# Patient Record
Sex: MALE | Race: WHITE | NOT HISPANIC OR LATINO | URBAN - METROPOLITAN AREA
[De-identification: names, ages, dates, MRNs, and addresses within clinical notes are randomized per-mention and may not be internally consistent; named-entity substitution may affect disease eponyms.]

---

## 2018-11-26 ENCOUNTER — EMERGENCY (EMERGENCY)
Facility: HOSPITAL | Age: 63
LOS: 1 days | Discharge: HOME | End: 2018-11-26
Attending: EMERGENCY MEDICINE
Payer: MEDICAID

## 2018-11-26 ENCOUNTER — TRANSCRIPTION ENCOUNTER (OUTPATIENT)
Age: 63
End: 2018-11-26

## 2018-11-26 VITALS
DIASTOLIC BLOOD PRESSURE: 93 MMHG | SYSTOLIC BLOOD PRESSURE: 171 MMHG | TEMPERATURE: 98 F | OXYGEN SATURATION: 100 % | WEIGHT: 160.06 LBS | HEIGHT: 66 IN | HEART RATE: 98 BPM | RESPIRATION RATE: 14 BRPM

## 2018-11-26 VITALS
DIASTOLIC BLOOD PRESSURE: 86 MMHG | OXYGEN SATURATION: 99 % | TEMPERATURE: 98 F | SYSTOLIC BLOOD PRESSURE: 168 MMHG | HEART RATE: 95 BPM | RESPIRATION RATE: 16 BRPM

## 2018-11-26 DIAGNOSIS — Z98.890 OTHER SPECIFIED POSTPROCEDURAL STATES: Chronic | ICD-10-CM

## 2018-11-26 DIAGNOSIS — Z90.49 ACQUIRED ABSENCE OF OTHER SPECIFIED PARTS OF DIGESTIVE TRACT: Chronic | ICD-10-CM

## 2018-11-26 LAB
ANION GAP SERPL CALC-SCNC: 6 MMOL/L — SIGNIFICANT CHANGE UP (ref 5–17)
BASOPHILS # BLD AUTO: 0.01 K/UL — SIGNIFICANT CHANGE UP (ref 0–0.2)
BASOPHILS NFR BLD AUTO: 0.3 % — SIGNIFICANT CHANGE UP (ref 0–2)
BUN SERPL-MCNC: 20 MG/DL — SIGNIFICANT CHANGE UP (ref 7–23)
CALCIUM SERPL-MCNC: 8.3 MG/DL — LOW (ref 8.5–10.1)
CHLORIDE SERPL-SCNC: 102 MMOL/L — SIGNIFICANT CHANGE UP (ref 96–108)
CO2 SERPL-SCNC: 28 MMOL/L — SIGNIFICANT CHANGE UP (ref 22–31)
CREAT SERPL-MCNC: 1.1 MG/DL — SIGNIFICANT CHANGE UP (ref 0.5–1.3)
EOSINOPHIL # BLD AUTO: 0.11 K/UL — SIGNIFICANT CHANGE UP (ref 0–0.5)
EOSINOPHIL NFR BLD AUTO: 2.8 % — SIGNIFICANT CHANGE UP (ref 0–6)
GLUCOSE SERPL-MCNC: 312 MG/DL — HIGH (ref 70–99)
HCT VFR BLD CALC: 35.3 % — LOW (ref 39–50)
HGB BLD-MCNC: 12.7 G/DL — LOW (ref 13–17)
IMM GRANULOCYTES NFR BLD AUTO: 0.3 % — SIGNIFICANT CHANGE UP (ref 0–1.5)
LYMPHOCYTES # BLD AUTO: 1.59 K/UL — SIGNIFICANT CHANGE UP (ref 1–3.3)
LYMPHOCYTES # BLD AUTO: 40.5 % — SIGNIFICANT CHANGE UP (ref 13–44)
MCHC RBC-ENTMCNC: 27.7 PG — SIGNIFICANT CHANGE UP (ref 27–34)
MCHC RBC-ENTMCNC: 36 GM/DL — SIGNIFICANT CHANGE UP (ref 32–36)
MCV RBC AUTO: 77.1 FL — LOW (ref 80–100)
MONOCYTES # BLD AUTO: 0.42 K/UL — SIGNIFICANT CHANGE UP (ref 0–0.9)
MONOCYTES NFR BLD AUTO: 10.7 % — SIGNIFICANT CHANGE UP (ref 2–14)
NEUTROPHILS # BLD AUTO: 1.79 K/UL — LOW (ref 1.8–7.4)
NEUTROPHILS NFR BLD AUTO: 45.4 % — SIGNIFICANT CHANGE UP (ref 43–77)
NRBC # BLD: 0 /100 WBCS — SIGNIFICANT CHANGE UP (ref 0–0)
PLATELET # BLD AUTO: 229 K/UL — SIGNIFICANT CHANGE UP (ref 150–400)
POTASSIUM SERPL-MCNC: 3.8 MMOL/L — SIGNIFICANT CHANGE UP (ref 3.5–5.3)
POTASSIUM SERPL-SCNC: 3.8 MMOL/L — SIGNIFICANT CHANGE UP (ref 3.5–5.3)
RBC # BLD: 4.58 M/UL — SIGNIFICANT CHANGE UP (ref 4.2–5.8)
RBC # FLD: 12.9 % — SIGNIFICANT CHANGE UP (ref 10.3–14.5)
SODIUM SERPL-SCNC: 136 MMOL/L — SIGNIFICANT CHANGE UP (ref 135–145)
WBC # BLD: 3.93 K/UL — SIGNIFICANT CHANGE UP (ref 3.8–10.5)
WBC # FLD AUTO: 3.93 K/UL — SIGNIFICANT CHANGE UP (ref 3.8–10.5)

## 2018-11-26 PROCEDURE — 93970 EXTREMITY STUDY: CPT | Mod: 26

## 2018-11-26 PROCEDURE — 93970 EXTREMITY STUDY: CPT

## 2018-11-26 PROCEDURE — 85027 COMPLETE CBC AUTOMATED: CPT

## 2018-11-26 PROCEDURE — 80048 BASIC METABOLIC PNL TOTAL CA: CPT

## 2018-11-26 PROCEDURE — 99284 EMERGENCY DEPT VISIT MOD MDM: CPT | Mod: 25

## 2018-11-26 PROCEDURE — 99283 EMERGENCY DEPT VISIT LOW MDM: CPT

## 2018-11-26 PROCEDURE — 82962 GLUCOSE BLOOD TEST: CPT

## 2018-11-26 PROCEDURE — 36415 COLL VENOUS BLD VENIPUNCTURE: CPT

## 2018-11-26 RX ORDER — SODIUM CHLORIDE 9 MG/ML
1000 INJECTION INTRAMUSCULAR; INTRAVENOUS; SUBCUTANEOUS ONCE
Qty: 0 | Refills: 0 | Status: COMPLETED | OUTPATIENT
Start: 2018-11-26 | End: 2018-11-26

## 2018-11-26 RX ADMIN — SODIUM CHLORIDE 2000 MILLILITER(S): 9 INJECTION INTRAMUSCULAR; INTRAVENOUS; SUBCUTANEOUS at 16:27

## 2018-11-26 NOTE — ED PROVIDER NOTE - MEDICAL DECISION MAKING DETAILS
leg pain sp long drive 16 hours , hx dm,  good pulses, no swelling, ro dvt, vs  sciatica, neuro intact, check us, labs for dm,  d/c if neg leg pain sp long drive 16 hours , hx dm,  good pulses, no swelling, ro dvt, vs  sciatica, neuro intact, check us, labs for dm,  d/c if neg--fu endo

## 2018-11-26 NOTE — ED ADULT NURSE NOTE - OBJECTIVE STATEMENT
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

## 2018-11-26 NOTE — ED ADULT TRIAGE NOTE - CHIEF COMPLAINT QUOTE
"He has pain in his right leg."  pt c/o several days of pain down right leg; recently had long car ride

## 2018-11-26 NOTE — ED ADULT NURSE NOTE - NSIMPLEMENTINTERV_GEN_ALL_ED
Implemented All Universal Safety Interventions:  [place] to call system. Call bell, personal items and telephone within reach. Instruct patient to call for assistance. Room bathroom lighting operational. Non-slip footwear when patient is off stretcher. Physically safe environment: no spills, clutter or unnecessary equipment. Stretcher in lowest position, wheels locked, appropriate side rails in place.

## 2018-11-26 NOTE — ED PROVIDER NOTE - NEUROLOGICAL, MLM
Alert and oriented, no focal deficits, no motor or sensory deficits.  motor 5/5, sensory intact, no saddle anesthesia

## 2018-11-26 NOTE — ED PROVIDER NOTE - CARE PLAN
Principal Discharge DX:	Leg pain Principal Discharge DX:	Leg pain  Secondary Diagnosis:	Diabetes mellitus

## 2019-08-08 ENCOUNTER — INPATIENT (INPATIENT)
Facility: HOSPITAL | Age: 64
LOS: 1 days | Discharge: HOME | DRG: 247 | End: 2019-08-10
Attending: INTERNAL MEDICINE | Admitting: INTERNAL MEDICINE
Payer: MEDICARE

## 2019-08-08 ENCOUNTER — EMERGENCY (EMERGENCY)
Facility: HOSPITAL | Age: 64
LOS: 1 days | Discharge: ANOTHER HOSPITAL | End: 2019-08-08
Attending: EMERGENCY MEDICINE | Admitting: EMERGENCY MEDICINE
Payer: MEDICARE

## 2019-08-08 VITALS
HEIGHT: 66 IN | OXYGEN SATURATION: 97 % | SYSTOLIC BLOOD PRESSURE: 166 MMHG | HEART RATE: 80 BPM | DIASTOLIC BLOOD PRESSURE: 68 MMHG | WEIGHT: 160.06 LBS | TEMPERATURE: 98 F | RESPIRATION RATE: 18 BRPM

## 2019-08-08 VITALS
RESPIRATION RATE: 18 BRPM | TEMPERATURE: 99 F | HEART RATE: 91 BPM | SYSTOLIC BLOOD PRESSURE: 146 MMHG | DIASTOLIC BLOOD PRESSURE: 82 MMHG | WEIGHT: 160.06 LBS | OXYGEN SATURATION: 96 %

## 2019-08-08 DIAGNOSIS — Z98.890 OTHER SPECIFIED POSTPROCEDURAL STATES: Chronic | ICD-10-CM

## 2019-08-08 DIAGNOSIS — R07.9 CHEST PAIN, UNSPECIFIED: ICD-10-CM

## 2019-08-08 PROBLEM — E78.00 PURE HYPERCHOLESTEROLEMIA, UNSPECIFIED: Chronic | Status: ACTIVE | Noted: 2018-11-26

## 2019-08-08 PROBLEM — E11.9 TYPE 2 DIABETES MELLITUS WITHOUT COMPLICATIONS: Chronic | Status: ACTIVE | Noted: 2018-11-26

## 2019-08-08 LAB
ALBUMIN SERPL ELPH-MCNC: 3.4 G/DL — SIGNIFICANT CHANGE UP (ref 3.3–5)
ALP SERPL-CCNC: 89 U/L — SIGNIFICANT CHANGE UP (ref 40–120)
ALT FLD-CCNC: 18 U/L — SIGNIFICANT CHANGE UP (ref 12–78)
ANION GAP SERPL CALC-SCNC: 9 MMOL/L — SIGNIFICANT CHANGE UP (ref 5–17)
AST SERPL-CCNC: 15 U/L — SIGNIFICANT CHANGE UP (ref 15–37)
BILIRUB SERPL-MCNC: 0.3 MG/DL — SIGNIFICANT CHANGE UP (ref 0.2–1.2)
BUN SERPL-MCNC: 21 MG/DL — SIGNIFICANT CHANGE UP (ref 7–23)
CALCIUM SERPL-MCNC: 8.3 MG/DL — LOW (ref 8.5–10.1)
CHLORIDE SERPL-SCNC: 105 MMOL/L — SIGNIFICANT CHANGE UP (ref 96–108)
CO2 SERPL-SCNC: 26 MMOL/L — SIGNIFICANT CHANGE UP (ref 22–31)
CREAT SERPL-MCNC: 1.3 MG/DL — SIGNIFICANT CHANGE UP (ref 0.5–1.3)
D DIMER BLD IA.RAPID-MCNC: <150 NG/ML DDU — SIGNIFICANT CHANGE UP
GLUCOSE BLDC GLUCOMTR-MCNC: 100 MG/DL — HIGH (ref 70–99)
GLUCOSE BLDC GLUCOMTR-MCNC: 116 MG/DL — HIGH (ref 70–99)
GLUCOSE SERPL-MCNC: 142 MG/DL — HIGH (ref 70–99)
HCT VFR BLD CALC: 33.6 % — LOW (ref 39–50)
HGB BLD-MCNC: 11.6 G/DL — LOW (ref 13–17)
MCHC RBC-ENTMCNC: 27.9 PG — SIGNIFICANT CHANGE UP (ref 27–34)
MCHC RBC-ENTMCNC: 34.5 GM/DL — SIGNIFICANT CHANGE UP (ref 32–36)
MCV RBC AUTO: 80.8 FL — SIGNIFICANT CHANGE UP (ref 80–100)
NRBC # BLD: 0 /100 WBCS — SIGNIFICANT CHANGE UP (ref 0–0)
NT-PROBNP SERPL-SCNC: 543 PG/ML — HIGH (ref 0–125)
PLATELET # BLD AUTO: 220 K/UL — SIGNIFICANT CHANGE UP (ref 150–400)
POTASSIUM SERPL-MCNC: 4.2 MMOL/L — SIGNIFICANT CHANGE UP (ref 3.5–5.3)
POTASSIUM SERPL-SCNC: 4.2 MMOL/L — SIGNIFICANT CHANGE UP (ref 3.5–5.3)
PROT SERPL-MCNC: 7.6 G/DL — SIGNIFICANT CHANGE UP (ref 6–8.3)
RBC # BLD: 4.16 M/UL — LOW (ref 4.2–5.8)
RBC # FLD: 13 % — SIGNIFICANT CHANGE UP (ref 10.3–14.5)
SODIUM SERPL-SCNC: 140 MMOL/L — SIGNIFICANT CHANGE UP (ref 135–145)
TROPONIN I SERPL-MCNC: <.015 NG/ML — SIGNIFICANT CHANGE UP (ref 0.01–0.04)
WBC # BLD: 3.54 K/UL — LOW (ref 3.8–10.5)
WBC # FLD AUTO: 3.54 K/UL — LOW (ref 3.8–10.5)

## 2019-08-08 PROCEDURE — 93010 ELECTROCARDIOGRAM REPORT: CPT

## 2019-08-08 PROCEDURE — 84484 ASSAY OF TROPONIN QUANT: CPT

## 2019-08-08 PROCEDURE — 36415 COLL VENOUS BLD VENIPUNCTURE: CPT

## 2019-08-08 PROCEDURE — 71046 X-RAY EXAM CHEST 2 VIEWS: CPT

## 2019-08-08 PROCEDURE — 85379 FIBRIN DEGRADATION QUANT: CPT

## 2019-08-08 PROCEDURE — 99285 EMERGENCY DEPT VISIT HI MDM: CPT

## 2019-08-08 PROCEDURE — 99291 CRITICAL CARE FIRST HOUR: CPT | Mod: 25

## 2019-08-08 PROCEDURE — 80053 COMPREHEN METABOLIC PANEL: CPT

## 2019-08-08 PROCEDURE — 93005 ELECTROCARDIOGRAM TRACING: CPT

## 2019-08-08 PROCEDURE — 71046 X-RAY EXAM CHEST 2 VIEWS: CPT | Mod: 26

## 2019-08-08 PROCEDURE — 85027 COMPLETE CBC AUTOMATED: CPT

## 2019-08-08 PROCEDURE — 83880 ASSAY OF NATRIURETIC PEPTIDE: CPT

## 2019-08-08 PROCEDURE — 99291 CRITICAL CARE FIRST HOUR: CPT

## 2019-08-08 RX ORDER — INSULIN LISPRO 100/ML
VIAL (ML) SUBCUTANEOUS AT BEDTIME
Refills: 0 | Status: DISCONTINUED | OUTPATIENT
Start: 2019-08-08 | End: 2019-08-10

## 2019-08-08 RX ORDER — METFORMIN HYDROCHLORIDE 850 MG/1
0 TABLET ORAL
Qty: 0 | Refills: 0 | DISCHARGE

## 2019-08-08 RX ORDER — INSULIN LISPRO 100/ML
VIAL (ML) SUBCUTANEOUS
Refills: 0 | Status: DISCONTINUED | OUTPATIENT
Start: 2019-08-08 | End: 2019-08-10

## 2019-08-08 RX ORDER — DEXTROSE 50 % IN WATER 50 %
25 SYRINGE (ML) INTRAVENOUS ONCE
Refills: 0 | Status: DISCONTINUED | OUTPATIENT
Start: 2019-08-08 | End: 2019-08-10

## 2019-08-08 RX ORDER — GABAPENTIN 400 MG/1
0 CAPSULE ORAL
Qty: 0 | Refills: 0 | DISCHARGE

## 2019-08-08 RX ORDER — TICAGRELOR 90 MG/1
180 TABLET ORAL ONCE
Refills: 0 | Status: COMPLETED | OUTPATIENT
Start: 2019-08-08 | End: 2019-08-08

## 2019-08-08 RX ORDER — DEXTROSE 50 % IN WATER 50 %
12.5 SYRINGE (ML) INTRAVENOUS ONCE
Refills: 0 | Status: DISCONTINUED | OUTPATIENT
Start: 2019-08-08 | End: 2019-08-10

## 2019-08-08 RX ORDER — GLUCAGON INJECTION, SOLUTION 0.5 MG/.1ML
1 INJECTION, SOLUTION SUBCUTANEOUS ONCE
Refills: 0 | Status: DISCONTINUED | OUTPATIENT
Start: 2019-08-08 | End: 2019-08-10

## 2019-08-08 RX ORDER — DEXTROSE 50 % IN WATER 50 %
15 SYRINGE (ML) INTRAVENOUS ONCE
Refills: 0 | Status: DISCONTINUED | OUTPATIENT
Start: 2019-08-08 | End: 2019-08-10

## 2019-08-08 RX ORDER — SODIUM CHLORIDE 9 MG/ML
1000 INJECTION, SOLUTION INTRAVENOUS
Refills: 0 | Status: DISCONTINUED | OUTPATIENT
Start: 2019-08-08 | End: 2019-08-10

## 2019-08-08 RX ORDER — ASPIRIN/CALCIUM CARB/MAGNESIUM 324 MG
325 TABLET ORAL ONCE
Refills: 0 | Status: COMPLETED | OUTPATIENT
Start: 2019-08-08 | End: 2019-08-08

## 2019-08-08 RX ADMIN — TICAGRELOR 180 MILLIGRAM(S): 90 TABLET ORAL at 17:44

## 2019-08-08 RX ADMIN — Medication 325 MILLIGRAM(S): at 17:44

## 2019-08-08 NOTE — CONSULT NOTE ADULT - ATTENDING COMMENTS
I saw and examined the patient personally. Spoke with above provider regarding this case. I reviewed the above findings completely.  I agree with the above history, physical, and plan which I have edited where appropriate.   He is having unstable angina.   At this point need coronary angiography. r/b/a explained and he agrees. Transfer arranged with Dr. Pisano to [facility] as above

## 2019-08-08 NOTE — CONSULT NOTE ADULT - SUBJECTIVE AND OBJECTIVE BOX
CHIEF COMPLAINT: Patient is a 64y old  Male who presents with a chief complaint of CP    HPI: THis is a 65 y/o male w/ PMH hld, HTN, DM Who presented today to the ED c/o CP x 1 month with SOB over the last week.  The chest pressure began as an exertional pressure which went away with rest but has now progressed to pain at rest including waking him from a sleep last night.  He has had a decrease in exercise tolerance where he is unable to walk up one flight of stairs or to the corner of his block.  No complaints of  palpitations  N/V, HA reported. No signs of orthopnea or PND.       PAST MEDICAL & SURGICAL HISTORY:  HTN (hypertension)  High cholesterol  Diabetes mellitus  H/O hernia repair      SOCIAL HISTORY:  No tobacco, ethanol, or drug abuse.    FAMILY HISTORY:  No pertinent family history in first degree relatives    No family history of acute MI or sudden cardiac death.    MEDICATIONS  (STANDING):    MEDICATIONS  (PRN):      Allergies    No Known Allergies    Intolerances        REVIEW OF SYSTEMS:    CONSTITUTIONAL: No weakness, fevers or chills  EYES/ENT: No visual changes;  No vertigo or throat pain   NECK: No pain or stiffness  RESPIRATORY: No cough, wheezing, hemoptysis; No shortness of breath  CARDIOVASCULAR: No chest pain or palpitations  GASTROINTESTINAL: No abdominal pain. No nausea, vomiting, or hematemesis; No diarrhea or constipation. No melena or hematochezia.  GENITOURINARY: No dysuria, frequency or hematuria  NEUROLOGICAL: No numbness or weakness  SKIN: No itching or rash  All other review of systems is negative unless indicated above    VITAL SIGNS:   Vital Signs Last 24 Hrs  T(C): 37.2 (08 Aug 2019 15:07), Max: 37.2 (08 Aug 2019 15:07)  T(F): 98.9 (08 Aug 2019 15:07), Max: 98.9 (08 Aug 2019 15:07)  HR: 91 (08 Aug 2019 15:07) (91 - 91)  BP: 146/82 (08 Aug 2019 15:07) (146/82 - 146/82)  BP(mean): --  RR: 18 (08 Aug 2019 15:07) (18 - 18)  SpO2: 96% (08 Aug 2019 15:07) (96% - 96%)    I&O's Summary      On Exam:    Constitutional: NAD, alert and oriented x 3  Lungs:  Non-labored, breath sounds are clear bilaterally, No wheezing, rales or rhonchi  Cardiovascular: RRR.  S1 and S2 positive.  No murmurs, rubs, gallops or clicks  Gastrointestinal: Bowel Sounds present, soft, nontender.   Lymph: No peripheral edema. No cervical lymphadenopathy.  Neurological: Alert, no focal deficits  Skin: No rashes or ulcers   Psych:  Mood & affect appropriate.    LABS: All Labs Reviewed:                        11.6   3.54  )-----------( 220      ( 08 Aug 2019 15:57 )             33.6     08 Aug 2019 15:57    140    |  105    |  21     ----------------------------<  142    4.2     |  26     |  1.30     Ca    8.3        08 Aug 2019 15:57    TPro  7.6    /  Alb  3.4    /  TBili  0.3    /  DBili  x      /  AST  15     /  ALT  18     /  AlkPhos  89     08 Aug 2019 15:57      CARDIAC MARKERS ( 08 Aug 2019 15:57 )  <.015 ng/mL / x     / x     / x     / x          Blood Culture:   08-08 @ 15:57  Pro Bnp 543        RADIOLOGY:  < from: Xray Chest 2 Views PA/Lat (08.08.19 @ 16:01) >  EXAM:  XR CHEST PA LAT 2V                            PROCEDURE DATE:  08/08/2019          INTERPRETATION:  Chest pain.    PA lateral.    Heart and mediastinum normal.  Lungs clear.  Costophrenic angles sharp   bilaterally. Degenerative spondylosis dorsal spine    IMPRESSION: No active disease                BENJAMÍN FARIAS M.D., ATTENDING RADIOLOGIST  This document has been electronically signed. Aug  8 2019  4:05PM                < end of copied text >    EKG:  NSR @ 90  Assessment/Plan:  64y Male CHIEF COMPLAINT: Patient is a 64y old  Male who presents with a chief complaint of CP    HPI: THis is a 63 y/o male w/ PMH hld, HTN, DM Who presented today to the ED c/o CP x 1 month with SOB over the last week.  The chest pressure began as an exertional pressure which went away with rest but has now progressed to pain at rest including waking him from a sleep last night.  He has had a decrease in exercise tolerance where he is unable to walk up one flight of stairs or to the corner of his block.  No complaints of  palpitations  N/V, HA reported. No signs of orthopnea or PND.       PAST MEDICAL & SURGICAL HISTORY:  HTN (hypertension)  High cholesterol  Diabetes mellitus  H/O hernia repair      SOCIAL HISTORY:  No tobacco, ethanol, or drug abuse.    FAMILY HISTORY:  Brother with early CAD  No family history  sudden cardiac death.    MEDICATIONS  (STANDING):    MEDICATIONS  (PRN):      Allergies    No Known Allergies    Intolerances        REVIEW OF SYSTEMS:    CONSTITUTIONAL: No weakness, fevers or chills  EYES/ENT: No visual changes;  No vertigo or throat pain   NECK: No pain or stiffness  RESPIRATORY: No cough, wheezing, hemoptysis; No shortness of breath  CARDIOVASCULAR: No chest pain or palpitations  GASTROINTESTINAL: No abdominal pain. No nausea, vomiting, or hematemesis; No diarrhea or constipation. No melena or hematochezia.  GENITOURINARY: No dysuria, frequency or hematuria  NEUROLOGICAL: No numbness or weakness  SKIN: No itching or rash  All other review of systems is negative unless indicated above    VITAL SIGNS:   Vital Signs Last 24 Hrs  T(C): 37.2 (08 Aug 2019 15:07), Max: 37.2 (08 Aug 2019 15:07)  T(F): 98.9 (08 Aug 2019 15:07), Max: 98.9 (08 Aug 2019 15:07)  HR: 91 (08 Aug 2019 15:07) (91 - 91)  BP: 146/82 (08 Aug 2019 15:07) (146/82 - 146/82)  BP(mean): --  RR: 18 (08 Aug 2019 15:07) (18 - 18)  SpO2: 96% (08 Aug 2019 15:07) (96% - 96%)    I&O's Summary      On Exam:    Constitutional: NAD, alert and oriented x 3  HEENT: MMM. anicteric  Lungs:  Non-labored, breath sounds are clear bilaterally, No wheezing, rales or rhonchi  Cardiovascular: RRR.  S1 and S2 positive.  No murmurs, rubs, gallops or clicks  Gastrointestinal: Bowel Sounds present, soft, nontender.   Lymph: No peripheral edema. No cervical lymphadenopathy.  Neurological: Alert, no focal deficits  Skin: No rashes or ulcers   Psych:  Mood & affect appropriate.    LABS: All Labs Reviewed:                        11.6   3.54  )-----------( 220      ( 08 Aug 2019 15:57 )             33.6     08 Aug 2019 15:57    140    |  105    |  21     ----------------------------<  142    4.2     |  26     |  1.30     Ca    8.3        08 Aug 2019 15:57    TPro  7.6    /  Alb  3.4    /  TBili  0.3    /  DBili  x      /  AST  15     /  ALT  18     /  AlkPhos  89     08 Aug 2019 15:57      CARDIAC MARKERS ( 08 Aug 2019 15:57 )  <.015 ng/mL / x     / x     / x     / x          Blood Culture:   08-08 @ 15:57  Pro Bnp 543        RADIOLOGY:  < from: Xray Chest 2 Views PA/Lat (08.08.19 @ 16:01) >  EXAM:  XR CHEST PA LAT 2V                            PROCEDURE DATE:  08/08/2019          INTERPRETATION:  Chest pain.    PA lateral.    Heart and mediastinum normal.  Lungs clear.  Costophrenic angles sharp   bilaterally. Degenerative spondylosis dorsal spine    IMPRESSION: No active disease                BENJAMÍN FARIAS M.D., ATTENDING RADIOLOGIST  This document has been electronically signed. Aug  8 2019  4:05PM                < end of copied text >    EKG:  NSR @ 90  Assessment/Plan:  64y Male

## 2019-08-08 NOTE — CONSULT NOTE ADULT - ASSESSMENT
THis is a 63 y/o male w/ PMH hld, HTN, DM Who presented today to the ED c/o CP x 1 month which has progressed to unstable angina with SOB over the last week.     Pt has been symptomatic   -there is no evidence of acute ischemia.  ECg w/o ischemic changes CE negative x1  The patient's progression of Angina from stable to unstable and the decreased exercise tolerance is concerning   - We will arrange for transport to [facility] for a cornary angiogram  - Give  mg po x1  - Brilinta 180 mg PO x1     -there is no evidence of significant arrhythmia.  ECg NSR    -there is no evidence for meaningful  volume overload.    -Pt is hemodynamically stable  -Monitor and replete lytes, keep K>4 and Mg >2  Thank you for the consult  Abel Lees [facility]  Cardiology   Spectra #[phone]   Will continue to follow  Abel Lees ANP

## 2019-08-08 NOTE — ED ADULT NURSE NOTE - NSIMPLEMENTINTERV_GEN_ALL_ED
Implemented All Universal Safety Interventions:  [place] to call system. Call bell, personal items and telephone within reach. Instruct patient to call for assistance. Room bathroom lighting operational. Non-slip footwear when patient is off stretcher. Physically safe environment: no spills, clutter or unnecessary equipment. Stretcher in lowest position, wheels locked, appropriate side rails in place.

## 2019-08-08 NOTE — ED PROVIDER NOTE - PROGRESS NOTE DETAILS
All results were explained to patient and/or family and a copy of all available results given.  case dw RENETTA Diaz, transfer to cardiac cath

## 2019-08-08 NOTE — ED ADULT NURSE NOTE - TEMPLATE
How Many Skin Cancers Have You Had?: more than one
What Is The Reason For Today's Visit?: Follow Up Non-Melanoma Skin Cancer
When Was Your Last Cancer Diagnosed?: 2017
Cardiac

## 2019-08-08 NOTE — ED PROVIDER NOTE - OBJECTIVE STATEMENT
Left chest pain x 3.5-4 weeks.  exertional sob.  pmd- Declan in [facility] [place].  no prior episodes .  no other complaint.

## 2019-08-08 NOTE — H&P CARDIOLOGY - HISTORY OF PRESENT ILLNESS
65 y/o male w/ PMH hld, HTN, DM Who presented today to [place]  the ED with c/o CP x 1 month with SOB over the last week.  The chest pressure began as an exertional pressure which went away with rest but has now progressed to pain at rest including waking him from a sleep last night.  He has had a decrease in exercise tolerance where he is unable to walk up one flight of stairs or to the corner of his block.  Pt now transferred to [facility] for possible cardiac cath 63 y/o male w/ PMH hld, HTN, DM Who presented today to [facility] ED  with c/o substernal chest pain for approx one month with associated SOB. Pt reports that initially, he had chest pressure on exertion. However now it has progressed to chest pain even at rest.  He also reports chest pain at rest and a  decrease in exercise tolerance. He denies N/V/D/ or palpitations.  Pt now transferred to [facility] for possible cardiac cath

## 2019-08-09 ENCOUNTER — TRANSCRIPTION ENCOUNTER (OUTPATIENT)
Age: 64
End: 2019-08-09

## 2019-08-09 LAB
ANION GAP SERPL CALC-SCNC: 13 MMOL/L — SIGNIFICANT CHANGE UP (ref 5–17)
BUN SERPL-MCNC: 21 MG/DL — SIGNIFICANT CHANGE UP (ref 7–23)
CALCIUM SERPL-MCNC: 8.9 MG/DL — SIGNIFICANT CHANGE UP (ref 8.4–10.5)
CHLORIDE SERPL-SCNC: 102 MMOL/L — SIGNIFICANT CHANGE UP (ref 96–108)
CO2 SERPL-SCNC: 24 MMOL/L — SIGNIFICANT CHANGE UP (ref 22–31)
CREAT SERPL-MCNC: 1.27 MG/DL — SIGNIFICANT CHANGE UP (ref 0.5–1.3)
GLUCOSE BLDC GLUCOMTR-MCNC: 100 MG/DL — HIGH (ref 70–99)
GLUCOSE BLDC GLUCOMTR-MCNC: 109 MG/DL — HIGH (ref 70–99)
GLUCOSE BLDC GLUCOMTR-MCNC: 128 MG/DL — HIGH (ref 70–99)
GLUCOSE BLDC GLUCOMTR-MCNC: 143 MG/DL — HIGH (ref 70–99)
GLUCOSE BLDC GLUCOMTR-MCNC: 156 MG/DL — HIGH (ref 70–99)
GLUCOSE SERPL-MCNC: 115 MG/DL — HIGH (ref 70–99)
HBA1C BLD-MCNC: 5.9 % — HIGH (ref 4–5.6)
HCT VFR BLD CALC: 35.1 % — LOW (ref 39–50)
HGB BLD-MCNC: 11.7 G/DL — LOW (ref 13–17)
MCHC RBC-ENTMCNC: 27.3 PG — SIGNIFICANT CHANGE UP (ref 27–34)
MCHC RBC-ENTMCNC: 33.3 GM/DL — SIGNIFICANT CHANGE UP (ref 32–36)
MCV RBC AUTO: 81.9 FL — SIGNIFICANT CHANGE UP (ref 80–100)
PLATELET # BLD AUTO: 208 K/UL — SIGNIFICANT CHANGE UP (ref 150–400)
POTASSIUM SERPL-MCNC: 3.7 MMOL/L — SIGNIFICANT CHANGE UP (ref 3.5–5.3)
POTASSIUM SERPL-SCNC: 3.7 MMOL/L — SIGNIFICANT CHANGE UP (ref 3.5–5.3)
RBC # BLD: 4.28 M/UL — SIGNIFICANT CHANGE UP (ref 4.2–5.8)
RBC # FLD: 12.5 % — SIGNIFICANT CHANGE UP (ref 10.3–14.5)
SODIUM SERPL-SCNC: 139 MMOL/L — SIGNIFICANT CHANGE UP (ref 135–145)
WBC # BLD: 4 K/UL — SIGNIFICANT CHANGE UP (ref 3.8–10.5)
WBC # FLD AUTO: 4 K/UL — SIGNIFICANT CHANGE UP (ref 3.8–10.5)

## 2019-08-09 PROCEDURE — 92978 ENDOLUMINL IVUS OCT C 1ST: CPT | Mod: 26,LD

## 2019-08-09 PROCEDURE — 93010 ELECTROCARDIOGRAM REPORT: CPT

## 2019-08-09 PROCEDURE — 93458 L HRT ARTERY/VENTRICLE ANGIO: CPT | Mod: 26,59

## 2019-08-09 PROCEDURE — 99222 1ST HOSP IP/OBS MODERATE 55: CPT

## 2019-08-09 PROCEDURE — 99152 MOD SED SAME PHYS/QHP 5/>YRS: CPT

## 2019-08-09 PROCEDURE — 92928 PRQ TCAT PLMT NTRAC ST 1 LES: CPT | Mod: RC

## 2019-08-09 RX ORDER — GABAPENTIN 400 MG/1
100 CAPSULE ORAL
Refills: 0 | Status: DISCONTINUED | OUTPATIENT
Start: 2019-08-09 | End: 2019-08-10

## 2019-08-09 RX ORDER — GEMFIBROZIL 600 MG
600 TABLET ORAL
Refills: 0 | Status: DISCONTINUED | OUTPATIENT
Start: 2019-08-09 | End: 2019-08-10

## 2019-08-09 RX ORDER — ATORVASTATIN CALCIUM 80 MG/1
40 TABLET, FILM COATED ORAL AT BEDTIME
Refills: 0 | Status: DISCONTINUED | OUTPATIENT
Start: 2019-08-09 | End: 2019-08-10

## 2019-08-09 RX ORDER — CLOPIDOGREL BISULFATE 75 MG/1
300 TABLET, FILM COATED ORAL ONCE
Refills: 0 | Status: COMPLETED | OUTPATIENT
Start: 2019-08-09 | End: 2019-08-09

## 2019-08-09 RX ORDER — ACETAMINOPHEN 500 MG
650 TABLET ORAL ONCE
Refills: 0 | Status: COMPLETED | OUTPATIENT
Start: 2019-08-09 | End: 2019-08-09

## 2019-08-09 RX ORDER — CLOPIDOGREL BISULFATE 75 MG/1
75 TABLET, FILM COATED ORAL DAILY
Refills: 0 | Status: DISCONTINUED | OUTPATIENT
Start: 2019-08-09 | End: 2019-08-10

## 2019-08-09 RX ORDER — ASPIRIN/CALCIUM CARB/MAGNESIUM 324 MG
81 TABLET ORAL DAILY
Refills: 0 | Status: DISCONTINUED | OUTPATIENT
Start: 2019-08-09 | End: 2019-08-10

## 2019-08-09 RX ORDER — TICAGRELOR 90 MG/1
90 TABLET ORAL
Refills: 0 | Status: DISCONTINUED | OUTPATIENT
Start: 2019-08-09 | End: 2019-08-09

## 2019-08-09 RX ORDER — METFORMIN HYDROCHLORIDE 850 MG/1
500 TABLET ORAL
Qty: 0 | Refills: 0 | DISCHARGE

## 2019-08-09 RX ADMIN — Medication 650 MILLIGRAM(S): at 06:13

## 2019-08-09 RX ADMIN — Medication 81 MILLIGRAM(S): at 06:37

## 2019-08-09 RX ADMIN — Medication 650 MILLIGRAM(S): at 05:43

## 2019-08-09 RX ADMIN — CLOPIDOGREL BISULFATE 300 MILLIGRAM(S): 75 TABLET, FILM COATED ORAL at 17:38

## 2019-08-09 RX ADMIN — ATORVASTATIN CALCIUM 40 MILLIGRAM(S): 80 TABLET, FILM COATED ORAL at 21:00

## 2019-08-09 RX ADMIN — Medication 600 MILLIGRAM(S): at 17:39

## 2019-08-09 RX ADMIN — TICAGRELOR 90 MILLIGRAM(S): 90 TABLET ORAL at 06:37

## 2019-08-09 RX ADMIN — GABAPENTIN 100 MILLIGRAM(S): 400 CAPSULE ORAL at 17:40

## 2019-08-09 NOTE — DISCHARGE NOTE PROVIDER - CARE PROVIDERS DIRECT ADDRESSES
[email or web address]

## 2019-08-09 NOTE — DISCHARGE NOTE PROVIDER - HOSPITAL COURSE
65 y/o male w/ PMH hld, HTN, DM Who presented today to [facility] ED  with c/o substernal chest pain for approx one month with associated SOB. Pt reports that initially, he had chest pressure on exertion which  has progressed to chest pain even at rest and decrease   exercise tolerance. He denies N/V/D/ or palpitations.  Pt now transferred to [facility] for possible cardiac cath. Pt is now s/p LHC ANNIA x 1 mLAD; ANNIA x 1 pRCA via RRA. Pt tolerated the procedure well, site benign. Post-procedure discharge instructions discussed and questions addressed

## 2019-08-09 NOTE — CONSULT NOTE ADULT - ASSESSMENT
Mr. Peacock is a 65 y/o male w/ HLD, HTN, DM Who presented yesterday with chest pain suggestive of unstable angina. He was transferred for cardiac cath.    - Plan for cardiac cath today  - continue with ASA, Brilinta 90 bid and lipitor 40  - EKG is unremarkable without obvious ischemia, though poor r wave progression.  - there is no evidence of significant arrhythmia. He remains in a SR.  - there is no evidence for meaningful volume overload.  - Monitor and replete lytes, keep K>4 and Mg >2  - Will follow with you.

## 2019-08-09 NOTE — DISCHARGE NOTE PROVIDER - NSDCCPCAREPLAN_GEN_ALL_CORE_FT
PRINCIPAL DISCHARGE DIAGNOSIS  Diagnosis: CAD (coronary artery disease)  Assessment and Plan of Treatment: Pt remains chest pain free and understands post cath discharge instructions   No heavy lifting or pushing/pulling with procedure arm for 2 weeks. No driving for 2 days. You may shower 24 hours following the procedure but avoid baths/swimming for 1 week. Check your wrist site for bleeding and/or swelling daily following procedure and call your doctor immediately if it occurs or if you experience increased pain at the site. Follow up with your cardiologist in 1-2 weeks. You may call [facility] if you have any questions/concerns regarding your procedure [phone].      SECONDARY DISCHARGE DIAGNOSES  Diagnosis: HLD (hyperlipidemia)  Assessment and Plan of Treatment: Your blood pressure will be controlled.   Continue with your blood pressure medications; eat a heart healthy diet with low salt diet; exercise regularly (consult with your physician or cardiologist first); maintain a heart healthy weight; if you smoke - quit (A resource to help you stop smoking is the [facility] Info [employer] for Tobacco Control – phone number 188-217-0543.); include healthy ways to manage stress. Continue to follow with your primary care physician or cardiologist.    Diagnosis: HTN (hypertension)  Assessment and Plan of Treatment: Your blood pressure will be controlled.   Continue with your blood pressure medications; eat a heart healthy diet with low salt diet; exercise regularly (consult with your physician or cardiologist first); maintain a heart healthy weight; if you smoke - quit (A resource to help you stop smoking is the [facility] Info Assembly for Tobacco Control – phone number 991-364-9199.); include healthy ways to manage stress. Continue to follow with your primary care physician or cardiologist.

## 2019-08-09 NOTE — CHART NOTE - NSCHARTNOTEFT_GEN_A_CORE
Patient underwent a PCI procedure and is being admitted as they are at increased risk for major adverse cardiac and vascular events if discharged due to the following high risk characteristics: unstable angina.  Pt. s/p ANNIA x 2 MLAD, PRCA.  Continue ASA/Brilinta/statin.  Pt. is to followup 1-2 weeks at cardiology clinic.

## 2019-08-09 NOTE — DISCHARGE NOTE PROVIDER - NSDCCPTREATMENT_GEN_ALL_CORE_FT
PRINCIPAL PROCEDURE  Procedure: Left heart catheterization  Findings and Treatment: s/p LHC ANNIA x 1 mLAD and ANNIA x 1 RCA via RRA access.

## 2019-08-09 NOTE — DISCHARGE NOTE PROVIDER - CARE PROVIDER_API CALL
Michael Pisano)  Cardiology; Internal Medicine; Interventional Cardiology  [facility] Dept of Cardiology, [address] [place] [address]  Phone: [phone]  Fax: [phone]  Follow Up Time: Abel Wilcox)  Cardiovascular Disease  [address] [place] [address]  Phone: [phone]  Fax: [phone]  Follow Up Time:

## 2019-08-09 NOTE — CONSULT NOTE ADULT - SUBJECTIVE AND OBJECTIVE BOX
[facility] - Juan Manuel Mckeon, Kenzie Wilcox, Cynthia, Chente Diaz  Office Number: [phone]    Initial Consult Note    CHIEF COMPLAINT: Patient is a 64y old  Male who presents with a chief complaint of     HPI:  65 y/o male w/ PMH hld, HTN, DM Who presented today to [facility] ED  with c/o substernal chest pain for approx one month with associated SOB. Pt reports that initially, he had chest pressure on exertion. However now it has progressed to chest pain even at rest.  He also reports chest pain at rest and a  decrease in exercise tolerance. He denies N/V/D/ or palpitations.  Pt now transferred to [facility] for possible cardiac cath (08 Aug 2019 21:07)    He reports a small amount of cp this morning.  No shortness of breath or palpitations.      PAST MEDICAL & SURGICAL HISTORY:  HTN (hypertension)  High cholesterol  Diabetes mellitus  H/O hernia repair      SOCIAL HISTORY:  No tobacco, ethanol, or drug abuse.    FAMILY HISTORY:  brother with premature cad    No family history of acute MI or sudden cardiac death.    MEDICATIONS  (STANDING):  aspirin enteric coated 81 milliGRAM(s) Oral daily  atorvastatin 40 milliGRAM(s) Oral at bedtime  dextrose 5%. 1000 milliLiter(s) (50 mL/Hr) IV Continuous <Continuous>  dextrose 50% Injectable 12.5 Gram(s) IV Push once  dextrose 50% Injectable 25 Gram(s) IV Push once  dextrose 50% Injectable 25 Gram(s) IV Push once  gabapentin 100 milliGRAM(s) Oral two times a day  gemfibrozil 600 milliGRAM(s) Oral two times a day  insulin lispro (HumaLOG) corrective regimen sliding scale   SubCutaneous three times a day before meals  insulin lispro (HumaLOG) corrective regimen sliding scale   SubCutaneous at bedtime  ticagrelor 90 milliGRAM(s) Oral two times a day    MEDICATIONS  (PRN):  dextrose 40% Gel 15 Gram(s) Oral once PRN Blood Glucose LESS THAN 70 milliGRAM(s)/deciliter  glucagon  Injectable 1 milliGRAM(s) IntraMuscular once PRN Glucose LESS THAN 70 milligrams/deciliter      Allergies    No Known Allergies    Intolerances        REVIEW OF SYSTEMS:    CONSTITUTIONAL: No weakness, fevers or chills  EYES/ENT: No visual changes;  No vertigo or throat pain   NECK: No pain or stiffness  RESPIRATORY: No cough, wheezing, hemoptysis; No shortness of breath  CARDIOVASCULAR: +chest pain, no palpitations  GASTROINTESTINAL: No abdominal pain. No nausea, vomiting, or hematemesis; No diarrhea or constipation. No melena or hematochezia.  GENITOURINARY: No dysuria, frequency or hematuria  NEUROLOGICAL: No numbness or weakness  SKIN: No itching or rash  All other review of systems is negative unless indicated above    VITAL SIGNS:   Vital Signs Last 24 Hrs  T(C): 36.5 (09 Aug 2019 05:18), Max: 37.2 (08 Aug 2019 15:07)  T(F): 97.7 (09 Aug 2019 05:18), Max: 98.9 (08 Aug 2019 15:07)  HR: 89 (09 Aug 2019 05:18) (80 - 92)  BP: 144/84 (09 Aug 2019 05:18) (144/84 - 166/68)  BP(mean): 100 (08 Aug 2019 21:07) (100 - 100)  RR: 16 (09 Aug 2019 05:18) (16 - 18)  SpO2: 97% (09 Aug 2019 05:18) (96% - 97%)    I&O's Summary    08 Aug 2019 07:01  -  09 Aug 2019 06:56  --------------------------------------------------------  IN: 180 mL / OUT: 0 mL / NET: 180 mL        On Exam:    Constitutional: NAD, alert and oriented x 3  Lungs:  Non-labored, breath sounds are clear bilaterally, No wheezing, rales or rhonchi  Cardiovascular: RRR.  S1 and S2 positive.  No murmurs, rubs, gallops or clicks  Gastrointestinal: Bowel Sounds present, soft, nontender.   Lymph: No peripheral edema. No cervical lymphadenopathy.  Neurological: Alert, no focal deficits  Skin: No rashes or ulcers   Psych:  Mood & affect appropriate.    LABS: All Labs Reviewed:                        11.7   4.0   )-----------( 208      ( 09 Aug 2019 03:05 )             35.1                         11.6   3.54  )-----------( 220      ( 08 Aug 2019 15:57 )             33.6     09 Aug 2019 03:05    139    |  102    |  21     ----------------------------<  115    3.7     |  24     |  1.27   08 Aug 2019 15:57    140    |  105    |  21     ----------------------------<  142    4.2     |  26     |  1.30     Ca    8.9        09 Aug 2019 03:05  Ca    8.3        08 Aug 2019 15:57    TPro  7.6    /  Alb  3.4    /  TBili  0.3    /  DBili  x      /  AST  15     /  ALT  18     /  AlkPhos  89     08 Aug 2019 15:57      CARDIAC MARKERS ( 08 Aug 2019 15:57 )  <.015 ng/mL / x     / x     / x     / x          Blood Culture:   08-08 @ 15:57  Pro Bnp 543        RADIOLOGY:    EKG: sr, lad, poor r wave progression

## 2019-08-10 ENCOUNTER — TRANSCRIPTION ENCOUNTER (OUTPATIENT)
Age: 64
End: 2019-08-10

## 2019-08-10 VITALS
OXYGEN SATURATION: 98 % | RESPIRATION RATE: 16 BRPM | HEART RATE: 91 BPM | SYSTOLIC BLOOD PRESSURE: 133 MMHG | DIASTOLIC BLOOD PRESSURE: 74 MMHG

## 2019-08-10 LAB
ALBUMIN SERPL ELPH-MCNC: 3.7 G/DL — SIGNIFICANT CHANGE UP (ref 3.3–5)
ALP SERPL-CCNC: 76 U/L — SIGNIFICANT CHANGE UP (ref 40–120)
ALT FLD-CCNC: 11 U/L — SIGNIFICANT CHANGE UP (ref 10–45)
ANION GAP SERPL CALC-SCNC: 14 MMOL/L — SIGNIFICANT CHANGE UP (ref 5–17)
ANION GAP SERPL CALC-SCNC: 14 MMOL/L — SIGNIFICANT CHANGE UP (ref 5–17)
AST SERPL-CCNC: 17 U/L — SIGNIFICANT CHANGE UP (ref 10–40)
BASOPHILS # BLD AUTO: 0 K/UL — SIGNIFICANT CHANGE UP (ref 0–0.2)
BASOPHILS NFR BLD AUTO: 0.1 % — SIGNIFICANT CHANGE UP (ref 0–2)
BILIRUB SERPL-MCNC: 0.3 MG/DL — SIGNIFICANT CHANGE UP (ref 0.2–1.2)
BUN SERPL-MCNC: 23 MG/DL — SIGNIFICANT CHANGE UP (ref 7–23)
BUN SERPL-MCNC: 25 MG/DL — HIGH (ref 7–23)
CALCIUM SERPL-MCNC: 9.1 MG/DL — SIGNIFICANT CHANGE UP (ref 8.4–10.5)
CALCIUM SERPL-MCNC: 9.1 MG/DL — SIGNIFICANT CHANGE UP (ref 8.4–10.5)
CHLORIDE SERPL-SCNC: 99 MMOL/L — SIGNIFICANT CHANGE UP (ref 96–108)
CHLORIDE SERPL-SCNC: 99 MMOL/L — SIGNIFICANT CHANGE UP (ref 96–108)
CO2 SERPL-SCNC: 22 MMOL/L — SIGNIFICANT CHANGE UP (ref 22–31)
CO2 SERPL-SCNC: 24 MMOL/L — SIGNIFICANT CHANGE UP (ref 22–31)
CREAT SERPL-MCNC: 1.11 MG/DL — SIGNIFICANT CHANGE UP (ref 0.5–1.3)
CREAT SERPL-MCNC: 1.38 MG/DL — HIGH (ref 0.5–1.3)
EOSINOPHIL # BLD AUTO: 0.1 K/UL — SIGNIFICANT CHANGE UP (ref 0–0.5)
EOSINOPHIL NFR BLD AUTO: 1.5 % — SIGNIFICANT CHANGE UP (ref 0–6)
GLUCOSE BLDC GLUCOMTR-MCNC: 133 MG/DL — HIGH (ref 70–99)
GLUCOSE SERPL-MCNC: 134 MG/DL — HIGH (ref 70–99)
GLUCOSE SERPL-MCNC: 184 MG/DL — HIGH (ref 70–99)
HCT VFR BLD CALC: 35.6 % — LOW (ref 39–50)
HGB BLD-MCNC: 12.4 G/DL — LOW (ref 13–17)
LYMPHOCYTES # BLD AUTO: 1.5 K/UL — SIGNIFICANT CHANGE UP (ref 1–3.3)
LYMPHOCYTES # BLD AUTO: 26.6 % — SIGNIFICANT CHANGE UP (ref 13–44)
MCHC RBC-ENTMCNC: 28.6 PG — SIGNIFICANT CHANGE UP (ref 27–34)
MCHC RBC-ENTMCNC: 34.8 GM/DL — SIGNIFICANT CHANGE UP (ref 32–36)
MCV RBC AUTO: 82 FL — SIGNIFICANT CHANGE UP (ref 80–100)
MONOCYTES # BLD AUTO: 0.6 K/UL — SIGNIFICANT CHANGE UP (ref 0–0.9)
MONOCYTES NFR BLD AUTO: 9.7 % — SIGNIFICANT CHANGE UP (ref 2–14)
NEUTROPHILS # BLD AUTO: 3.6 K/UL — SIGNIFICANT CHANGE UP (ref 1.8–7.4)
NEUTROPHILS NFR BLD AUTO: 62.1 % — SIGNIFICANT CHANGE UP (ref 43–77)
PLATELET # BLD AUTO: 193 K/UL — SIGNIFICANT CHANGE UP (ref 150–400)
POTASSIUM SERPL-MCNC: 4.2 MMOL/L — SIGNIFICANT CHANGE UP (ref 3.5–5.3)
POTASSIUM SERPL-MCNC: 4.3 MMOL/L — SIGNIFICANT CHANGE UP (ref 3.5–5.3)
POTASSIUM SERPL-SCNC: 4.2 MMOL/L — SIGNIFICANT CHANGE UP (ref 3.5–5.3)
POTASSIUM SERPL-SCNC: 4.3 MMOL/L — SIGNIFICANT CHANGE UP (ref 3.5–5.3)
PROT SERPL-MCNC: 7.1 G/DL — SIGNIFICANT CHANGE UP (ref 6–8.3)
RBC # BLD: 4.34 M/UL — SIGNIFICANT CHANGE UP (ref 4.2–5.8)
RBC # FLD: 12.6 % — SIGNIFICANT CHANGE UP (ref 10.3–14.5)
SODIUM SERPL-SCNC: 135 MMOL/L — SIGNIFICANT CHANGE UP (ref 135–145)
SODIUM SERPL-SCNC: 137 MMOL/L — SIGNIFICANT CHANGE UP (ref 135–145)
WBC # BLD: 5.7 K/UL — SIGNIFICANT CHANGE UP (ref 3.8–10.5)
WBC # FLD AUTO: 5.7 K/UL — SIGNIFICANT CHANGE UP (ref 3.8–10.5)

## 2019-08-10 PROCEDURE — 99232 SBSQ HOSP IP/OBS MODERATE 35: CPT

## 2019-08-10 RX ORDER — SODIUM CHLORIDE 9 MG/ML
250 INJECTION INTRAMUSCULAR; INTRAVENOUS; SUBCUTANEOUS ONCE
Refills: 0 | Status: DISCONTINUED | OUTPATIENT
Start: 2019-08-10 | End: 2019-08-10

## 2019-08-10 RX ORDER — CLOPIDOGREL BISULFATE 75 MG/1
1 TABLET, FILM COATED ORAL
Qty: 90 | Refills: 3
Start: 2019-08-10 | End: 2020-08-03

## 2019-08-10 RX ORDER — SODIUM CHLORIDE 9 MG/ML
250 INJECTION INTRAMUSCULAR; INTRAVENOUS; SUBCUTANEOUS ONCE
Refills: 0 | Status: COMPLETED | OUTPATIENT
Start: 2019-08-10 | End: 2019-08-10

## 2019-08-10 RX ORDER — ASPIRIN/CALCIUM CARB/MAGNESIUM 324 MG
1 TABLET ORAL
Qty: 30 | Refills: 1
Start: 2019-08-10 | End: 2019-10-08

## 2019-08-10 RX ADMIN — SODIUM CHLORIDE 125 MILLILITER(S): 9 INJECTION INTRAMUSCULAR; INTRAVENOUS; SUBCUTANEOUS at 05:20

## 2019-08-10 RX ADMIN — Medication 81 MILLIGRAM(S): at 05:14

## 2019-08-10 RX ADMIN — CLOPIDOGREL BISULFATE 75 MILLIGRAM(S): 75 TABLET, FILM COATED ORAL at 05:14

## 2019-08-10 RX ADMIN — Medication 600 MILLIGRAM(S): at 05:14

## 2019-08-10 RX ADMIN — GABAPENTIN 100 MILLIGRAM(S): 400 CAPSULE ORAL at 05:14

## 2019-08-10 NOTE — DISCHARGE NOTE NURSING/CASE MANAGEMENT/SOCIAL WORK - NSDCDPATPORTLINK_GEN_ALL_CORE
You can access the [facility] Patient Portal, offered by [facility], by registering with the following website: [email or web address]

## 2019-08-10 NOTE — PROGRESS NOTE ADULT - SUBJECTIVE AND OBJECTIVE BOX
Patient is a 64y old  Male who presents with a chief complaint of CAD (09 Aug 2019 19:16) now s/p C ANNIA x 1 mLAD and ANNIA x 1 pRCA via RRA access           Allergies    No Known Allergies    Intolerances        Medications:  aspirin enteric coated 81 milliGRAM(s) Oral daily  atorvastatin 40 milliGRAM(s) Oral at bedtime  clopidogrel Tablet 75 milliGRAM(s) Oral daily  dextrose 40% Gel 15 Gram(s) Oral once PRN  dextrose 5%. 1000 milliLiter(s) IV Continuous <Continuous>  dextrose 50% Injectable 12.5 Gram(s) IV Push once  dextrose 50% Injectable 25 Gram(s) IV Push once  dextrose 50% Injectable 25 Gram(s) IV Push once  gabapentin 100 milliGRAM(s) Oral two times a day  gemfibrozil 600 milliGRAM(s) Oral two times a day  glucagon  Injectable 1 milliGRAM(s) IntraMuscular once PRN  insulin lispro (HumaLOG) corrective regimen sliding scale   SubCutaneous three times a day before meals  insulin lispro (HumaLOG) corrective regimen sliding scale   SubCutaneous at bedtime      Vitals:  T(C): 36.7 (08-10-19 @ 05:11), Max: 36.7 (08-09-19 @ 19:45)  HR: 85 (08-10-19 @ 05:11) (81 - 92)  BP: 120/72 (08-10-19 @ 05:11) (120/72 - 154/85)  BP(mean): --  RR: 16 (08-10-19 @ 05:11) (16 - 18)  SpO2: 98% (08-10-19 @ 05:11) (95% - 99%)  Wt(kg): --  Daily     Daily   I&O's Summary    08 Aug 2019 07:01  -  09 Aug 2019 07:00  --------------------------------------------------------  IN: 180 mL / OUT: 0 mL / NET: 180 mL    09 Aug 2019 07:01  -  10 Aug 2019 06:39  --------------------------------------------------------  IN: 550 mL / OUT: 0 mL / NET: 550 mL          Physical Exam:  Appearance: Normal  Procedural Access Site: RRA access benign. No hematoma, Non-tender to palpation, 2+ pulse, No bruit, No Ecchymosis  Psychiatry: AAOx3, Mood & affect appropriate  Skin: No rashes, No ecchymoses, No cyanosis    08-10    137  |  99  |  25<H>  ----------------------------<  134<H>  4.2   |  24  |  1.38<H>    Ca    9.1      10 Aug 2019 02:43    TPro  7.1  /  Alb  3.7  /  TBili  0.3  /  DBili  x   /  AST  17  /  ALT  11  /  AlkPhos  76  08-10      CARDIAC MARKERS ( 08 Aug 2019 15:57 )  <.015 ng/mL / x     / x     / x     / x          Serum Pro-Brain Natriuretic Peptide: 543 pg/mL (08-08 @ 15:57)    Lipid panel   Hgb A1c Hemoglobin A1C, Whole Blood: 5.9 % (08-09 @ 08:34)          ECG:    Echo:    Stress Testing:     Cath:    Imaging:    Interpretation of Telemetry:
Follow up: CAD    HPI:  63 y/o male w/ PMH hld, HTN, DM Who presented today to [facility] ED  with c/o substernal chest pain for approx one month with associated SOB. Pt reports that initially, he had chest pressure on exertion. However now it has progressed to chest pain even at rest.  He also reports chest pain at rest and a  decrease in exercise tolerance. He denies N/V/D/ or palpitations.     He is awake and alert this morning, ambulating. He has no chest pain, dyspnea, or palpitations. He underwent percutaneous intervention to the right coronary artery and LAD      PAST MEDICAL & SURGICAL HISTORY:  HTN (hypertension)  High cholesterol  Diabetes mellitus  H/O hernia repair      MEDICATIONS  (STANDING):  aspirin enteric coated 81 milliGRAM(s) Oral daily  atorvastatin 40 milliGRAM(s) Oral at bedtime  clopidogrel Tablet 75 milliGRAM(s) Oral daily  dextrose 5%. 1000 milliLiter(s) (50 mL/Hr) IV Continuous <Continuous>  dextrose 50% Injectable 12.5 Gram(s) IV Push once  dextrose 50% Injectable 25 Gram(s) IV Push once  dextrose 50% Injectable 25 Gram(s) IV Push once  gabapentin 100 milliGRAM(s) Oral two times a day  gemfibrozil 600 milliGRAM(s) Oral two times a day  insulin lispro (HumaLOG) corrective regimen sliding scale   SubCutaneous three times a day before meals  insulin lispro (HumaLOG) corrective regimen sliding scale   SubCutaneous at bedtime  sodium chloride 0.9% Bolus 250 milliLiter(s) IV Bolus once    MEDICATIONS  (PRN):  dextrose 40% Gel 15 Gram(s) Oral once PRN Blood Glucose LESS THAN 70 milliGRAM(s)/deciliter  glucagon  Injectable 1 milliGRAM(s) IntraMuscular once PRN Glucose LESS THAN 70 milligrams/deciliter      REVIEW OF SYSTEMS:    CONSTITUTIONAL: No weakness, fevers or chills  EYES: No visual changes, No diplopia  ENMT: No throat pain , No exudate  NECK: No pain or stiffness  RESPIRATORY: No cough, wheezing, hemoptysis; No shortness of breath  CARDIOVASCULAR: No chest pain or palpitations  GASTROINTESTINAL: No abdominal pain. No nausea, vomiting, or hematemesis; No diarrhea or constipation. No melena or hematochezia.  GENITOURINARY: No dysuria, frequency or hematuria  NEUROLOGICAL: No numbness or weakness  SKIN: No itching or rash  All other review of systems is negative unless indicated above    Vital Signs Last 24 Hrs  T(C): 36.7 (10 Aug 2019 05:11), Max: 36.7 (09 Aug 2019 19:45)  T(F): 98.1 (10 Aug 2019 05:11), Max: 98.1 (09 Aug 2019 19:45)  HR: 85 (10 Aug 2019 05:11) (81 - 92)  BP: 120/72 (10 Aug 2019 05:11) (120/72 - 154/85)  BP(mean): --  RR: 16 (10 Aug 2019 05:11) (16 - 17)  SpO2: 98% (10 Aug 2019 05:11) (95% - 99%)    I&O's Summary    09 Aug 2019 07:  -  10 Aug 2019 07:00  --------------------------------------------------------  IN: 550 mL / OUT: 0 mL / NET: 550 mL    10 Aug 2019 07:01  -  10 Aug 2019 10:53  --------------------------------------------------------  IN: 140 mL / OUT: 0 mL / NET: 140 mL        PHYSICAL EXAM:    Constitutional: NAD, awake and alert, well-developed  Eyes:  EOMI,  Pupils round, no lesions  ENMT: no exudate or erythema  Pulmonary: Non-labored, breath sounds are clear bilaterally, No wheezing, rales or rhonchi  Cardiovascular: PMI not palpable RRR normal S1 and S2, no murmurs, rubs, gallops or clicks  Gastrointestinal: Bowel Sounds present, soft, nontender.   Lymph: No cervical lymphadenopathy.  Neurological: Alert, no focal deficits  Skin: No rashes.  No cyanosis.  Psych:  Mood & affect appropriate   Ext: No lower ext edema      CARDIAC MARKERS ( 08 Aug 2019 15:57 )  <.015 ng/mL / x     / x     / x     / x                                12.4   5.7   )-----------( 193      ( 10 Aug 2019 02:43 )             35.6     08-10    135  |  99  |  23  ----------------------------<  184<H>  4.3   |  22  |  1.11    Ca    9.1      10 Aug 2019 10:09    TPro  7.1  /  Alb  3.7  /  TBili  0.3  /  DBili  x   /  AST  17  /  ALT  11  /  AlkPhos  76  08-10    < from: 12 Lead ECG (19 @ 20:05) >    Ventricular Rate 89 BPM    Atrial Rate 89 BPM    P-R Interval 154 ms    QRS Duration 84 ms    Q-T Interval 382 ms    QTC Calculation(Bezet) 464 ms    P Axis 47 degrees    R Axis -29 degrees    T Axis 51 degrees    Diagnosis Line NORMAL SINUS RHYTHM  POSSIBLE ANTERIOR INFARCT , AGE UNDETERMINED  ABNORMAL ECG    Confirmed by MD CONTRERAS JONATHAN ([id number]) on 2019 5:24:14 PM    < end of copied text >    < from: Xray Chest 2 Views PA/Lat (19 @ 16:01) >    EXAM:  XR CHEST PA LAT 2V                            PROCEDURE DATE:  2019          INTERPRETATION:  Chest pain.    PA lateral.    Heart and mediastinum normal.  Lungs clear.  Costophrenic angles sharp   bilaterally. Degenerative spondylosis dorsal spine    IMPRESSION: No active disease                BENJAMÍN FARIAS M.D., ATTENDING RADIOLOGIST  This document has been electronically signed. Aug  8 2019  4:05PM                < end of copied text >  < from: Cardiac Cath Lab - Adult (19 @ 11:47) >    [facility]  Department of Cardiology  [address] [place] [address]  [phone]  Cath Lab Report -- Comprehensive Report  Patient: JAYLON CORNELL  Study date: 2019  Account number: [id number]  MR number: [id number]  : 1955  Gender: Male  Race: W  Case Physician(s):  CESAR Coulter M.D.  Referring Physician:  INDICATIONS: Unstable angina - CCS4.  HISTORY: There was no prior cardiac history. The patient has hypertension,  medication-treated dyslipidemia, and a family history of coronary artery  disease.  PROCEDURE:  --  Left heart catheterization.  --  Left coronary angiography.  --  Right coronary angiography.  --  Diagnostic IVUS.  --  Intervention on proximal RCA: drug-eluting stent, balloon angioplasty.  --  Intervention on mid LAD: drug-eluting stent, balloon angioplasty.  TECHNIQUE: The risks and alternatives of the procedures and conscious  sedation were explained to the patient and informed consent was obtained.  Cardiac catheterization performed electively.  Local anesthetic given. Right radial artery access. Local anesthetic given.  A 6FR PRELUDE KIT was inserted in the vessel, utilizing the modified  Seldinger technique. Left heart catheterization. A 6FRJR4 LAUNCHER  catheter was utilized. After recording ascending aortic pressure, the  catheter was advanced across the aortic valve and left ventricular  pressure was recorded. Left coronary artery angiography. The vessel was  injected utilizing a 6FRJL3.5 LAUNCHER catheter and positioned in the  vessel ostia under fluoroscopic guidance. Angiography was performed in  multiple projections using hand-injection of contrast. Right coronary  artery angiography. The vessel was injected utilizing a 6FR JR4 LAUNCHER  catheter and positioned in the vessel ostia under fluoroscopic guidance.  Angiography was performed in multiple projections using hand-injection of  contrast. Diagnostic IVUS was performed on the lesion in the mid LAD.  RADIATION EXPOSURE: 20.25 min. A successful drug-eluting stent with  balloon angioplasty was performed on the 90 % lesion in the proximal RCA.  Following intervention there was an excellent angiographic appearance with  a 1 % residual stenosis. This was a bifurcation lesion. According to the  ACC/AHA classification system, this lesion was a type C lesion. There was  no evidence of the transient no-reflow phenomenon. There was NAYE 3 flow  before the procedure and NAYE 3 flow after the procedure. Vessel setup was  performed. A BMW UNIVERSAL 190CM wire was used to cross the lesion. Vessel  setup was performed. A 6FR JR4 LAUNCHER guiding catheter was used to  intubate the vessel. Balloon angioplasty was performed, using a 2.50 X 12  APEX balloon, with 1 inflations. A 3.00 X 15 RICARDO drug-eluting stent was  placed across the lesion and deployed at a maximum inflation pressure of  14 kenji. A successful drug-eluting stent with balloon angioplasty was  performed on the 90 % lesion in the mid LAD. Following intervention there  was an excellent angiographic appearance with a 1 % residual stenosis.  This was a bifurcation lesion. According to the ACC/AHA classification  system, this lesion was a type C lesion. There was no evidence of the  transient no-reflow phenomenon. There was NAYE 3 flow before the procedure  and NAYE 3 flow after the procedure. Vessel setup was performed. A 6FR  JL3.5 LAUNCHER guiding catheter was used to intubate the vessel. Vessel  setup was performed. A BMW UNIVERSAL 190CM wire was used to cross the  lesion. A 3.00 X 22 RICARDO drug-eluting stent was placed across the lesion  and deployed at a maximum inflation pressure of 18 kenji. Balloon  angioplasty was performed, using a 3.50 X 15 NC APEX balloon, with 3  inflations and a maximum inflation pressure of 16 kenji.  CONTRAST GIVEN: Omnipaque 29 ml. Omnipaque 48 ml.  MEDICATIONS GIVEN: Midazolam, 1 mg, IV. Fentanyl, 25 mcg, IV. Heparin, 3000  units, IV. Heparin, 4000 units, IV. Heparin, 4000 units, IV. Cardene, 500  mcg.  VENTRICLES:No left ventriculogram was performed.  CORONARY VESSELS: The coronary circulation is right dominant.  LM:   --  LM: Normal.  LAD:   --  Mid LAD: There was a tubular 90 % stenosis. There was NAYE grade  3 flow through the vessel (brisk flow). This is a likely culprit for the  patient's clinical presentation. An intervention was performed.  CX:   --  Circumflex: Angiography showed mild atherosclerosis with no flow  limiting lesions.  RCA:   --  Proximal RCA: There was a tubular 90 % stenosis. Therewas NAYE  grade 3 flow through the vessel (brisk flow). This is a likely culprit for  the patient's clinical presentation.  COMPLICATIONS: There were no complications.  DIAGNOSTIC RECOMMENDATIONS:  1. Successful PCI to the pRCA (3.0mm Lynn ANNIA) and mLAD (3.0mm Lynn ANNAI  post-dilated to 3.5mm) in the setting of angina.  2. DAPT.  INTERVENTIONAL RECOMMENDATIONS:  1. Successful PCI to the pRCA (3.0mm Lynn ANNIA) and mLAD (3.0mm Ricardo ANNIA  post-dilated to 3.5mm) in the setting of angina.  2. DAPT.  Prepared and signed by  Michael Pisano M.D.  Signed 2019 18:12:25  HEMODYNAMIC TABLES  Pressures:  Baseline  Pressures:  - HR: 84  Pressures:  - Rhythm:  Pressures:  -- Aortic Pressure (S/D/M): 164/83/118  Outputs:  Baseline  Outputs:  -- CALCULATIONS[age]  Outputs:  -- CALCULATIONS: Body Surface Area: 1.82  Outputs:  -- CALCULATIONS: Height in cm: 168.00  Outputs:  -- CALCULATIONS: Sex: Male  Outputs:  -- CALCULATIONS: Weight in k.60    < end of copied text >

## 2019-08-10 NOTE — PROGRESS NOTE ADULT - ASSESSMENT
Patient is a 64y old  Male who presents with a chief complaint of CAD (09 Aug 2019 19:16) now s/p C ANNIA x 1 mLAD and ANNIA x 1 pRCA via RRA access. Pt tolerated the procedure well, site benign. Post-procedure discharge instructions discussed and questions addressed
Mr. Peacock is a 65 y/o male w/ HLD, HTN, DM Who presented  with chest pain suggestive of unstable angina. He was transferred for cardiac cath.   He is now status post percutaneous intervention to the right coronary artery and LAD. His morning with no chest pain or other complaints.    Recommend  - CAn D/C home  - continue with ASA, Brilinta 90 bid and lipitor 40  - EKG is unremarkable without obvious ischemia, though poor r wave progression.  - there is no evidence of significant arrhythmia. He remains in a SR.  - there is no evidence for meaningful volume overload.  - Monitor and replete lytes, keep K>4 and Mg >2  - Will follow up in office

## 2019-08-15 ENCOUNTER — EMERGENCY (EMERGENCY)
Facility: HOSPITAL | Age: 64
LOS: 1 days | Discharge: HOME | End: 2019-08-15
Attending: EMERGENCY MEDICINE
Payer: MEDICARE

## 2019-08-15 VITALS
RESPIRATION RATE: 18 BRPM | OXYGEN SATURATION: 98 % | HEART RATE: 87 BPM | DIASTOLIC BLOOD PRESSURE: 74 MMHG | WEIGHT: 160.06 LBS | TEMPERATURE: 98 F | HEIGHT: 66 IN | SYSTOLIC BLOOD PRESSURE: 138 MMHG

## 2019-08-15 VITALS
DIASTOLIC BLOOD PRESSURE: 77 MMHG | HEART RATE: 87 BPM | SYSTOLIC BLOOD PRESSURE: 132 MMHG | RESPIRATION RATE: 16 BRPM | OXYGEN SATURATION: 98 % | TEMPERATURE: 98 F

## 2019-08-15 DIAGNOSIS — Z98.890 OTHER SPECIFIED POSTPROCEDURAL STATES: Chronic | ICD-10-CM

## 2019-08-15 PROBLEM — I10 ESSENTIAL (PRIMARY) HYPERTENSION: Chronic | Status: ACTIVE | Noted: 2019-08-08

## 2019-08-15 LAB
ALBUMIN SERPL ELPH-MCNC: 4 G/DL — SIGNIFICANT CHANGE UP (ref 3.3–5)
ALP SERPL-CCNC: 87 U/L — SIGNIFICANT CHANGE UP (ref 40–120)
ALT FLD-CCNC: 11 U/L — SIGNIFICANT CHANGE UP (ref 10–45)
ANION GAP SERPL CALC-SCNC: 12 MMOL/L — SIGNIFICANT CHANGE UP (ref 5–17)
AST SERPL-CCNC: 10 U/L — SIGNIFICANT CHANGE UP (ref 10–40)
BASOPHILS # BLD AUTO: 0 K/UL — SIGNIFICANT CHANGE UP (ref 0–0.2)
BASOPHILS NFR BLD AUTO: 0.3 % — SIGNIFICANT CHANGE UP (ref 0–2)
BILIRUB SERPL-MCNC: 0.3 MG/DL — SIGNIFICANT CHANGE UP (ref 0.2–1.2)
BUN SERPL-MCNC: 23 MG/DL — SIGNIFICANT CHANGE UP (ref 7–23)
CALCIUM SERPL-MCNC: 9.2 MG/DL — SIGNIFICANT CHANGE UP (ref 8.4–10.5)
CHLORIDE SERPL-SCNC: 101 MMOL/L — SIGNIFICANT CHANGE UP (ref 96–108)
CK MB CFR SERPL CALC: 2.2 NG/ML — SIGNIFICANT CHANGE UP (ref 0–6.7)
CO2 SERPL-SCNC: 24 MMOL/L — SIGNIFICANT CHANGE UP (ref 22–31)
CREAT SERPL-MCNC: 1.14 MG/DL — SIGNIFICANT CHANGE UP (ref 0.5–1.3)
EOSINOPHIL # BLD AUTO: 0.1 K/UL — SIGNIFICANT CHANGE UP (ref 0–0.5)
EOSINOPHIL NFR BLD AUTO: 1.9 % — SIGNIFICANT CHANGE UP (ref 0–6)
GLUCOSE SERPL-MCNC: 121 MG/DL — HIGH (ref 70–99)
HCT VFR BLD CALC: 35.7 % — LOW (ref 39–50)
HGB BLD-MCNC: 11.6 G/DL — LOW (ref 13–17)
LYMPHOCYTES # BLD AUTO: 1.7 K/UL — SIGNIFICANT CHANGE UP (ref 1–3.3)
LYMPHOCYTES # BLD AUTO: 40.6 % — SIGNIFICANT CHANGE UP (ref 13–44)
MAGNESIUM SERPL-MCNC: 2.2 MG/DL — SIGNIFICANT CHANGE UP (ref 1.6–2.6)
MCHC RBC-ENTMCNC: 26.9 PG — LOW (ref 27–34)
MCHC RBC-ENTMCNC: 32.6 GM/DL — SIGNIFICANT CHANGE UP (ref 32–36)
MCV RBC AUTO: 82.7 FL — SIGNIFICANT CHANGE UP (ref 80–100)
MONOCYTES # BLD AUTO: 0.6 K/UL — SIGNIFICANT CHANGE UP (ref 0–0.9)
MONOCYTES NFR BLD AUTO: 14.4 % — HIGH (ref 2–14)
NEUTROPHILS # BLD AUTO: 1.8 K/UL — SIGNIFICANT CHANGE UP (ref 1.8–7.4)
NEUTROPHILS NFR BLD AUTO: 42.8 % — LOW (ref 43–77)
PLATELET # BLD AUTO: 241 K/UL — SIGNIFICANT CHANGE UP (ref 150–400)
POTASSIUM SERPL-MCNC: 4.2 MMOL/L — SIGNIFICANT CHANGE UP (ref 3.5–5.3)
POTASSIUM SERPL-SCNC: 4.2 MMOL/L — SIGNIFICANT CHANGE UP (ref 3.5–5.3)
PROT SERPL-MCNC: 7.9 G/DL — SIGNIFICANT CHANGE UP (ref 6–8.3)
RBC # BLD: 4.31 M/UL — SIGNIFICANT CHANGE UP (ref 4.2–5.8)
RBC # FLD: 12.5 % — SIGNIFICANT CHANGE UP (ref 10.3–14.5)
SODIUM SERPL-SCNC: 137 MMOL/L — SIGNIFICANT CHANGE UP (ref 135–145)
TROPONIN T, HIGH SENSITIVITY RESULT: 86 NG/L — HIGH (ref 0–51)
TROPONIN T, HIGH SENSITIVITY RESULT: 99 NG/L — HIGH (ref 0–51)
WBC # BLD: 4.1 K/UL — SIGNIFICANT CHANGE UP (ref 3.8–10.5)
WBC # FLD AUTO: 4.1 K/UL — SIGNIFICANT CHANGE UP (ref 3.8–10.5)

## 2019-08-15 PROCEDURE — 71046 X-RAY EXAM CHEST 2 VIEWS: CPT | Mod: 26

## 2019-08-15 PROCEDURE — 83735 ASSAY OF MAGNESIUM: CPT

## 2019-08-15 PROCEDURE — 85027 COMPLETE CBC AUTOMATED: CPT

## 2019-08-15 PROCEDURE — 99285 EMERGENCY DEPT VISIT HI MDM: CPT | Mod: GC

## 2019-08-15 PROCEDURE — 71046 X-RAY EXAM CHEST 2 VIEWS: CPT

## 2019-08-15 PROCEDURE — 80053 COMPREHEN METABOLIC PANEL: CPT

## 2019-08-15 PROCEDURE — 99283 EMERGENCY DEPT VISIT LOW MDM: CPT | Mod: 25

## 2019-08-15 PROCEDURE — 93010 ELECTROCARDIOGRAM REPORT: CPT

## 2019-08-15 PROCEDURE — 84484 ASSAY OF TROPONIN QUANT: CPT

## 2019-08-15 PROCEDURE — 93005 ELECTROCARDIOGRAM TRACING: CPT

## 2019-08-15 PROCEDURE — 82553 CREATINE MB FRACTION: CPT

## 2019-08-15 RX ORDER — ISOSORBIDE MONONITRATE 60 MG/1
1 TABLET, EXTENDED RELEASE ORAL
Qty: 7 | Refills: 0
Start: 2019-08-15 | End: 2019-08-21

## 2019-08-15 NOTE — ED PROVIDER NOTE - OBJECTIVE STATEMENT
65yo man PMH HTN, HLD, DM, s/p ANNIA x 2 5 days ago presents with intermittent chest pain since PCI. He reports sharp pain that lasts a few seconds with no inciting factors, nonexertional. It is not associated with SOB, n/v, diaphoresis. He denies cough, fever, leg swelling or pain. No abdominal pain. He has been taking his aspirin and brilinta as prescribed.

## 2019-08-15 NOTE — CONSULT NOTE ADULT - ASSESSMENT
64 M w/ HTN, T2DM, CAD s/p ANNIA x2 5 days ago to RCA and LAD present with chest pain. Low suspicion for stent thrombosis given minimal troponin rise and relatively benign EKG. Pain likely from arterial stretching vs. small branch occlusion from recent procedure.    #CAD  -Pt currently without chest pain. He requests to transfer care to [facility] cardi[facility] clinic. Pt agreeable to discharge with close outpatient follow-up and TTE. Advised pt to return to ER if he develops worsening or severe chest pain.  -Continue home medications, unclear why he is not on statin (pt is not sure either) but would start lipitor 80mg if no contraindications.    Brandon Cristina PGY4    Plan pending discussion with attending. 64 M w/ HTN, T2DM, CAD s/p ANNIA x2 5 days ago to RCA and LAD present with chest pain. Low suspicion for stent thrombosis given minimal troponin rise and relatively benign EKG. Pain likely from arterial stretching vs. small branch occlusion from recent procedure.    #CAD  -Pt currently without chest pain. He requests to transfer care to [facility] cardiology clinic. Pt agreeable to discharge with close outpatient follow-up and TTE. Advised pt to return to ER if he develops worsening or severe chest pain.  -Continue home medications, unclear why he is not on statin (pt is not sure either) but would start lipitor 80mg if no contraindications.  -Can start Imdur 30mg for anti-anginal effect    Brandon Cristina PGY4    Plan pending discussion with attending. 64 M w/ HTN, T2DM, CAD s/p ANNIA x2 5 days ago to RCA and LAD present with chest pain. Pain is likely noncardiac in nature.     Continue plavix 75mg po daily, ASA 81mg po daily, isosorbide 30mg po daily, lipitor 80mg po daily, metoprolol xl 25mg po daily. TTE to be performed as an outpatient for evaluation of LV function. Follow up with cardiology [phone] within one week. Recommend submaximal stress test as outpatient for evaluation of ischemia, exercise tolerance. If symptoms persist or worsten, return to ED for further treatment and evaluation.       Brandon Cristina PGY4    Thank you. Case discussed with ED staff.    Kavitha Pollock M.D, F.A.C.C  [facility]   [phone]

## 2019-08-15 NOTE — ED PROVIDER NOTE - PROGRESS NOTE DETAILS
Ambar Zepeda, resident MD: spoke with Dr. Diaz who recommends that house cardiology evaluate pt while in the ED. Ambar Zepeda, resident MD: pt was evaluated by cardiology and was cleared for d/c. awaiting eval by cardiology attending. received sign out on pt. Patient reassessed, NAD, non-toxic appearing. ambulating through department without difficulty. awaiting eval by cards attg. contacted fellow to determine when they would come, states they will contact attg.  - Jd Lynch D.O. PGY2 seen by cardiology attending dr Pollock  advised to start on isordil and follow cardiology next week ZR

## 2019-08-15 NOTE — ED PROVIDER NOTE - ATTENDING CONTRIBUTION TO CARE
Attending MD Sinclair:  I personally have seen and examined this patient.  Resident note reviewed and agree on plan of care and except where noted.

## 2019-08-15 NOTE — ED ADULT NURSE NOTE - OBJECTIVE STATEMENT
64 yrs old male with h/o stents x2 present to the ER for midsternal chest pain that started 3 days ago. As per pt he had stents placement done 08/08/19. Pt reported that he started to experience stabbing pain in his chest . Pt state " I felt like someone was using a knife ." Pt reports that pain would sometimes get sharp on a 10/10 pain scale and dull at times. Now reports pain level of 3/10 on pain scale. Pt denies taking any meds for pain. Wife reported that she called the cath lab to report pt's symptom and was told to come right away to the ER.

## 2019-08-15 NOTE — ED PROVIDER NOTE - PHYSICAL EXAMINATION
General: well-appearing elderly man in no acute distress  Head: normocephalic, atraumatic  Eyes: PERRL  Mouth: moist mucous membranes  Neck: supple neck, no lymphadenopathy  CV: normal rate and rhythm, peripheral pulses 2+ bilaterally  Respiratory: clear to auscultation bilaterally  Abdomen: soft, nontender, nondistended  : no suprapubic tenderness, no CVAT  MSK: no Cspine tenderness to palpation, no midline tenderness to palpation  Neuro: alert and oriented x3, speech clear, moving all extremities spontaneously, ambulating without difficulty  Skin: no rash on chest  Extremities: full ROM, no tenderness to palpation of joints

## 2019-08-15 NOTE — ED PROVIDER NOTE - CARE PROVIDER_API CALL
Kavitha Pollock)  Cardiology  [address] [place] [address]  Phone: [phone]  Fax: [phone]  Follow Up Time:

## 2019-08-15 NOTE — ED PROVIDER NOTE - NSFOLLOWUPINSTRUCTIONS_ED_ALL_ED_FT
Please follow up with the cardiologist, Dr. Plummer, in the next few days. Call the number attached to make an appointment.    Continue all home medications as prescribed.    Return to the ED for any worsening symptoms of chest pain, difficulty breathing, nausea or vomiting, lightheadedness, or any new or concerning symptoms.    Please read all attached. Please follow up with the cardiologist, Dr. Plummer, in the next few days. Call the number attached to make an appointment.    Take Imdur 30 mg once a day for chest pain    Continue all home medications as prescribed.    Return to the ED for any worsening symptoms of chest pain, difficulty breathing, nausea or vomiting, lightheadedness, or any new or concerning symptoms.    Please read all attached.

## 2019-08-15 NOTE — CONSULT NOTE ADULT - SUBJECTIVE AND OBJECTIVE BOX
All Cardiology service information can be found 24/7 on [email or web address], password: [email or web address]    Patient seen and evaluated at bedside    Chief Complaint: chest pain    HPI: 64 M w/ HTN, T2DM, CAD s/p ANNIA x2 5 days ago to RCA and LAD present with chest pain since PCI. Pt describes pain as different from the pain that preceded his LHC, describes it as sharp and stabbing. It is non-exertional and non-radiating. Pt denies any SOB, palpitations, cough, fevers, or chills. Pain is not pleuritic in nature. At time of evaluation, pt reports pain has largely subsided and he feels well.       PMHx:   HTN (hypertension)  High cholesterol  Diabetes mellitus      PSHx:   H/O hernia repair  S/P appendectomy      Allergies:  No Known Allergies      Home Meds: ASA, Plavix, Metformin, Toujeo     Current Medications:       FAMILY HISTORY:  No pertinent family history in first degree relatives      Social History: Denies smoking, alcohol, drug use    REVIEW OF SYSTEMS:  CONSTITUTIONAL: No weakness, fevers or chills  EYES/ENT: No visual changes;  No dysphagia  NECK: No pain or stiffness  RESPIRATORY: No cough, wheezing, hemoptysis; No shortness of breath  CARDIOVASCULAR: No chest pain or palpitations; No lower extremity edema  GASTROINTESTINAL: No abdominal or epigastric pain. No nausea, vomiting, or hematemesis; No diarrhea or constipation. No melena or hematochezia.  BACK: No back pain  GENITOURINARY: No dysuria, frequency or hematuria  NEUROLOGICAL: No numbness or weakness  SKIN: No itching, burning, rashes, or lesions   All other review of systems is negative unless indicated above.    Physical Exam:  T(F): 98.4 (08-15), Max: 98.4 (08-15)  HR: 84 (08-15) (84 - 87)  BP: 122/76 (08-15) (122/76 - 138/74)  RR: 14 (08-15)  SpO2: 97% (08-15)  GENERAL: No acute distress, well-developed  HEAD:  Atraumatic, Normocephalic  ENT: EOMI, PERRLA, conjunctiva and sclera clear, Neck supple, No JVD, moist mucosa  CHEST/LUNG: Clear to auscultation bilaterally; No wheeze, equal breath sounds bilaterally   BACK: No spinal tenderness  HEART: Regular rate and rhythm; No murmurs, rubs, or gallops  ABDOMEN: Soft, Nontender, Nondistended; Bowel sounds present  EXTREMITIES:  No clubbing, cyanosis, or edema  PSYCH: Nl behavior, nl affect  NEUROLOGY: AAOx3, non-focal, cranial nerves intact  SKIN: Normal color, No rashes or lesions  LINES:    Cardiovascular Diagnostic Testing:    ECG: Personally reviewed:    Echo: Personally reviewed:    Stress Testing:    Cath:    Imaging:    CXR: Personally reviewed    Labs: Personally reviewed                        11.6   4.1   )-----------( 241      ( 15 Aug 2019 13:12 )             35.7     08-15    137  |  101  |  23  ----------------------------<  121<H>  4.2   |  24  |  1.14    Ca    9.2      15 Aug 2019 13:12  Mg     2.2     08-15    TPro  7.9  /  Alb  4.0  /  TBili  0.3  /  DBili  x   /  AST  10  /  ALT  11  /  AlkPhos  87  08-15        CARDIAC MARKERS ( 15 Aug 2019 16:21 )  86 ng/L / x     / x     / x     / x     / x      CARDIAC MARKERS ( 15 Aug 2019 13:12 )  99 ng/L / x     / x     / x     / x     / 2.2 ng/mL            Hemoglobin A1C, Whole Blood: 5.9 % (08-09 @ 08:34) All Cardiology service information can be found 24/7 on [email or web address], password: [email or web address]    Patient seen and evaluated at bedside    Chief Complaint: chest pain    HPI: 64 M w/ HTN, T2DM, CAD s/p ANNIA x2 5 days ago to RCA and LAD present with chest pain since PCI. Pt describes pain as different from the pain that preceded his LHC, describes it as sharp and stabbing. It is non-exertional and non-radiating. Pt denies any SOB, palpitations, cough, fevers, or chills. Pain is not pleuritic in nature. At time of evaluation, pt reports pain has largely subsided and he feels well.       PMHx:   HTN (hypertension)  High cholesterol  Diabetes mellitus      PSHx:   H/O hernia repair  S/P appendectomy      Allergies:  No Known Allergies      Home Meds: ASA, Plavix, Metformin, Toujeo     Current Medications:       FAMILY HISTORY:  No pertinent family history in first degree relatives      Social History: Denies smoking, alcohol, drug use    REVIEW OF SYSTEMS:  CONSTITUTIONAL: No weakness, fevers or chills  EYES/ENT: No visual changes;  No dysphagia  NECK: No pain or stiffness  RESPIRATORY: No cough, wheezing, hemoptysis; No shortness of breath  CARDIOVASCULAR: +chest pain; No lower extremity edema, orthopnea or abd swelling  GASTROINTESTINAL: No abdominal or epigastric pain. No nausea, vomiting, or hematemesis; No diarrhea or constipation. No melena or hematochezia.  BACK: No back pain  GENITOURINARY: No dysuria, frequency or hematuria  NEUROLOGICAL: No numbness or weakness  SKIN: No itching, burning, rashes, or lesions   All other review of systems is negative unless indicated above.    Physical Exam:  T(F): 98.4 (08-15), Max: 98.4 (08-15)  HR: 84 (08-15) (84 - 87)  BP: 122/76 (08-15) (122/76 - 138/74)  RR: 14 (08-15)  SpO2: 97% (08-15)  GENERAL: No acute distress, well-developed  HEAD:  Atraumatic, Normocephalic  ENT: EOMI, PERRLA, conjunctiva and sclera clear, Neck supple, No JVD, moist mucosa  CHEST/LUNG: Clear to auscultation bilaterally; No wheeze, equal breath sounds bilaterally   BACK: No spinal tenderness  HEART: Regular rate and rhythm; No murmurs, rubs, or gallops  ABDOMEN: Soft, Nontender, Nondistended; Bowel sounds present  EXTREMITIES:  No clubbing, cyanosis, or edema  PSYCH: Nl behavior, nl affect  NEUROLOGY: AAOx3, non-focal, cranial nerves intact  SKIN: Normal color, No rashes or lesions  LINES:    Cardiovascular Diagnostic Testing:    ECG: Personally reviewed: NSR, non-specific twave changes in lateral leads    Echo: Personally reviewed:    Stress Testing:    Cath:    Imaging:    CXR: Personally reviewed    Labs: Personally reviewed                        11.6   4.1   )-----------( 241      ( 15 Aug 2019 13:12 )             35.7     08-15    137  |  101  |  23  ----------------------------<  121<H>  4.2   |  24  |  1.14    Ca    9.2      15 Aug 2019 13:12  Mg     2.2     08-15    TPro  7.9  /  Alb  4.0  /  TBili  0.3  /  DBili  x   /  AST  10  /  ALT  11  /  AlkPhos  87  08-15        CARDIAC MARKERS ( 15 Aug 2019 16:21 )  86 ng/L / x     / x     / x     / x     / x      CARDIAC MARKERS ( 15 Aug 2019 13:12 )  99 ng/L / x     / x     / x     / x     / 2.2 ng/mL            Hemoglobin A1C, Whole Blood: 5.9 % (08-09 @ 08:34) All Cardiology service information can be found 24/7 on [email or web address], password: [email or web address]    Patient seen and evaluated at bedside    Chief Complaint: chest pain    HPI: 64 M w/ HTN, T2DM, CAD s/p ANNIA x2 5 days ago to RCA and LAD present with chest pain since PCI. Pt describes pain as different from the pain that preceded his LHC, describes it as sharp and stabbing. It is non-exertional and non-radiating. Pt denies any SOB, palpitations, cough, fevers, or chills. Pain is not pleuritic in nature. At time of evaluation, pt reports pain has largely subsided and he feels well. No decrease in exercise tolerance. Patient is able to walk greater than two blocks, climbs stairs and pain does not bother patient at night during sleep. Constant in nature described as pins and needles.       PMHx:   HTN (hypertension)  High cholesterol  Diabetes mellitus      PSHx:   H/O hernia repair  S/P appendectomy      Allergies:  No Known Allergies      Home Meds: ASA, Plavix, Metformin, Toujeo     Current Medications:       FAMILY HISTORY:  No pertinent family history in first degree relatives      Social History: Denies smoking, alcohol, drug use    REVIEW OF SYSTEMS:  CONSTITUTIONAL: No weakness, fevers or chills  EYES/ENT: No visual changes;  No dysphagia  NECK: No pain or stiffness  RESPIRATORY: No cough, wheezing, hemoptysis; No shortness of breath  CARDIOVASCULAR: +chest pain; No lower extremity edema, orthopnea or abd swelling  GASTROINTESTINAL: No abdominal or epigastric pain. No nausea, vomiting, or hematemesis; No diarrhea or constipation. No melena or hematochezia.  BACK: No back pain  GENITOURINARY: No dysuria, frequency or hematuria  NEUROLOGICAL: No numbness or weakness  SKIN: No itching, burning, rashes, or lesions   All other review of systems is negative unless indicated above.    Physical Exam:  T(F): 98.4 (08-15), Max: 98.4 (08-15)  HR: 84 (08-15) (84 - 87)  BP: 122/76 (08-15) (122/76 - 138/74)  RR: 14 (08-15)  SpO2: 97% (08-15)  GENERAL: No acute distress, well-developed  HEAD:  Atraumatic, Normocephalic  ENT: EOMI, PERRLA, conjunctiva and sclera clear, Neck supple, No JVD, moist mucosa  CHEST/LUNG: Clear to auscultation bilaterally; No wheeze, equal breath sounds bilaterally   BACK: No spinal tenderness  HEART: Regular rate and rhythm; No murmurs, rubs, or gallops  ABDOMEN: Soft, Nontender, Nondistended; Bowel sounds present  EXTREMITIES:  No clubbing, cyanosis, or edema  PSYCH: Nl behavior, nl affect  NEUROLOGY: AAOx3, non-focal, cranial nerves intact  SKIN: Normal color, No rashes or lesions  LINES:    Cardiovascular Diagnostic Testing:    ECG: Personally reviewed: NSR, non-specific twave changes in lateral leads      < from: Cardiac Cath Lab - Adult (08.09.19 @ 11:47) >  VENTRICLES:No left ventriculogram was performed.  CORONARY VESSELS: The coronary circulation is right dominant.  LM:   --  LM: Normal.  LAD:   --  Mid LAD: There was a tubular 90 % stenosis. There was NAYE grade  3 flow through the vessel (brisk flow). This is a likely culprit for the  patient's clinical presentation. An intervention was performed.  CX:   --  Circumflex: Angiography showed mild atherosclerosis with no flow  limiting lesions.  RCA:   --  Proximal RCA: There was a tubular 90 % stenosis. Therewas NAYE  grade 3 flow through the vessel (brisk flow). This is a likely culprit for  the patient's clinical presentation.  COMPLICATIONS: There were no complications.  DIAGNOSTIC RECOMMENDATIONS:  1. Successful PCI to the pRCA (3.0mm Aurora ANNIA) and mLAD (3.0mm Aurora ANNIA  post-dilated to 3.5mm) in the setting of angina.  2. DAPT.  INTERVENTIONAL RECOMMENDATIONS:  1. Successful PCI to the pRCA (3.0mm Aurora ANNIA) and mLAD (3.0mm Huang ANNIA  post-dilated to 3.5mm) in the setting of angina.  2. DAPT.    < end of copied text >      CXR: Personally reviewed    Labs: Personally reviewed                        11.6   4.1   )-----------( 241      ( 15 Aug 2019 13:12 )             35.7     08-15    137  |  101  |  23  ----------------------------<  121<H>  4.2   |  24  |  1.14    Ca    9.2      15 Aug 2019 13:12  Mg     2.2     08-15    TPro  7.9  /  Alb  4.0  /  TBili  0.3  /  DBili  x   /  AST  10  /  ALT  11  /  AlkPhos  87  08-15        CARDIAC MARKERS ( 15 Aug 2019 16:21 )  86 ng/L / x     / x     / x     / x     / x      CARDIAC MARKERS ( 15 Aug 2019 13:12 )  99 ng/L / x     / x     / x     / x     / 2.2 ng/mL            Hemoglobin A1C, Whole Blood: 5.9 % (08-09 @ 08:34)

## 2019-08-15 NOTE — ED ADULT NURSE NOTE - CHPI ED NUR SYMPTOMS NEG
no nausea/no back pain/no syncope/no fever/no shortness of breath/no dizziness/no vomiting/no congestion/no diaphoresis/no chills

## 2019-08-15 NOTE — ED PROVIDER NOTE - NS ED ROS FT
General: no fevers or chills  Head: no headache or lightheadedness  Eyes: no vision change  ENT: no nasal discharge/congestion  CV: +chest pain  Resp: no SOB, no cough  GI: no N/V, no abdominal pain  : no dysuria  MSK: no joint pain, no muscle aches, no neck pain or back pain  Skin: no new rash  Neuro: no focal weakness, no change in sensation

## 2019-08-15 NOTE — ED PROVIDER NOTE - CLINICAL SUMMARY MEDICAL DECISION MAKING FREE TEXT BOX
since stents placed in LAD and Prox RCA done by Dr. Wilcox (admitted Dr. Pisano) 5 days ago and has been having intermittent chest pain.  No pain now.  Exam: A & O x 3, NAD, HEENT WNL and no facial asymmetry; lungs CTAB, heart with reg rhythm without murmur; abdomen soft NTND; extremities with no edema; skin with no rashes, neuro exam non focal with no motor or sensory deficits. Plan: consult with Dr. Wilcox and Norris.  Trend troponin.

## 2019-08-21 PROBLEM — Z00.00 ENCOUNTER FOR PREVENTIVE HEALTH EXAMINATION: Status: ACTIVE | Noted: 2019-08-21

## 2019-08-27 ENCOUNTER — APPOINTMENT (OUTPATIENT)
Dept: CARDIOLOGY | Facility: CLINIC | Age: 64
End: 2019-08-27
Payer: MEDICARE

## 2019-08-27 VITALS
HEART RATE: 84 BPM | DIASTOLIC BLOOD PRESSURE: 87 MMHG | WEIGHT: 160 LBS | OXYGEN SATURATION: 99 % | HEIGHT: 66 IN | BODY MASS INDEX: 25.71 KG/M2 | SYSTOLIC BLOOD PRESSURE: 164 MMHG

## 2019-08-27 VITALS — SYSTOLIC BLOOD PRESSURE: 159 MMHG | DIASTOLIC BLOOD PRESSURE: 90 MMHG

## 2019-08-27 DIAGNOSIS — E11.22 TYPE 2 DIABETES MELLITUS WITH DIABETIC CHRONIC KIDNEY DISEASE: ICD-10-CM

## 2019-08-27 DIAGNOSIS — G62.9 POLYNEUROPATHY, UNSPECIFIED: ICD-10-CM

## 2019-08-27 DIAGNOSIS — E11.9 TYPE 2 DIABETES MELLITUS W/OUT COMPLICATIONS: ICD-10-CM

## 2019-08-27 DIAGNOSIS — Z87.898 PERSONAL HISTORY OF OTHER SPECIFIED CONDITIONS: ICD-10-CM

## 2019-08-27 DIAGNOSIS — Z79.4 TYPE 2 DIABETES MELLITUS WITH DIABETIC CHRONIC KIDNEY DISEASE: ICD-10-CM

## 2019-08-27 PROCEDURE — 93000 ELECTROCARDIOGRAM COMPLETE: CPT

## 2019-08-27 PROCEDURE — 99204 OFFICE O/P NEW MOD 45 MIN: CPT

## 2019-08-27 PROCEDURE — 99214 OFFICE O/P EST MOD 30 MIN: CPT

## 2019-08-27 RX ORDER — INSULIN GLARGINE 300 U/ML
INJECTION, SOLUTION SUBCUTANEOUS
Refills: 0 | Status: ACTIVE | COMMUNITY

## 2019-08-27 RX ORDER — FENOFIBRATE 130 MG/1
CAPSULE ORAL
Refills: 0 | Status: ACTIVE | COMMUNITY

## 2019-08-27 RX ORDER — DAPAGLIFLOZIN 10 MG/1
TABLET, FILM COATED ORAL
Refills: 0 | Status: ACTIVE | COMMUNITY

## 2019-08-27 RX ORDER — METFORMIN HYDROCHLORIDE 500 MG/1
500 TABLET, COATED ORAL TWICE DAILY
Qty: 60 | Refills: 2 | Status: ACTIVE | COMMUNITY

## 2019-09-01 ENCOUNTER — OUTPATIENT (OUTPATIENT)
Dept: OUTPATIENT SERVICES | Facility: HOSPITAL | Age: 64
LOS: 1 days | End: 2019-09-01
Payer: MEDICARE

## 2019-09-01 DIAGNOSIS — Z98.890 OTHER SPECIFIED POSTPROCEDURAL STATES: Chronic | ICD-10-CM

## 2019-09-01 PROCEDURE — G9001: CPT

## 2019-09-09 DIAGNOSIS — Z71.89 OTHER SPECIFIED COUNSELING: ICD-10-CM

## 2019-09-19 PROCEDURE — 80053 COMPREHEN METABOLIC PANEL: CPT

## 2019-09-19 PROCEDURE — 93458 L HRT ARTERY/VENTRICLE ANGIO: CPT | Mod: 59

## 2019-09-19 PROCEDURE — C1753: CPT

## 2019-09-19 PROCEDURE — C1725: CPT

## 2019-09-19 PROCEDURE — C1874: CPT

## 2019-09-19 PROCEDURE — C9600: CPT | Mod: RC

## 2019-09-19 PROCEDURE — C1894: CPT

## 2019-09-19 PROCEDURE — 92978 ENDOLUMINL IVUS OCT C 1ST: CPT | Mod: LD

## 2019-09-19 PROCEDURE — 85027 COMPLETE CBC AUTOMATED: CPT

## 2019-09-19 PROCEDURE — 93005 ELECTROCARDIOGRAM TRACING: CPT

## 2019-09-19 PROCEDURE — 99153 MOD SED SAME PHYS/QHP EA: CPT

## 2019-09-19 PROCEDURE — 80048 BASIC METABOLIC PNL TOTAL CA: CPT

## 2019-09-19 PROCEDURE — C1887: CPT

## 2019-09-19 PROCEDURE — 83036 HEMOGLOBIN GLYCOSYLATED A1C: CPT

## 2019-09-19 PROCEDURE — 99152 MOD SED SAME PHYS/QHP 5/>YRS: CPT

## 2019-09-19 PROCEDURE — C1769: CPT

## 2019-09-19 PROCEDURE — 82962 GLUCOSE BLOOD TEST: CPT

## 2019-10-01 ENCOUNTER — OUTPATIENT (OUTPATIENT)
Dept: OUTPATIENT SERVICES | Facility: HOSPITAL | Age: 64
LOS: 1 days | End: 2019-10-01

## 2019-10-01 DIAGNOSIS — Z98.890 OTHER SPECIFIED POSTPROCEDURAL STATES: Chronic | ICD-10-CM

## 2019-10-25 DIAGNOSIS — Z71.89 OTHER SPECIFIED COUNSELING: ICD-10-CM

## 2019-10-29 NOTE — PHYSICAL EXAM
[General Appearance - Well Developed] : well developed [Normal Appearance] : normal appearance [Well Groomed] : well groomed [General Appearance - Well Nourished] : well nourished [No Deformities] : no deformities [General Appearance - In No Acute Distress] : no acute distress [Normal Conjunctiva] : the conjunctiva exhibited no abnormalities [Eyelids - No Xanthelasma] : the eyelids demonstrated no xanthelasmas [Normal Oral Mucosa] : normal oral mucosa [No Oral Pallor] : no oral pallor [No Oral Cyanosis] : no oral cyanosis [Normal Jugular Venous A Waves Present] : normal jugular venous A waves present [Normal Jugular Venous V Waves Present] : normal jugular venous V waves present [No Jugular Venous Montilla A Waves] : no jugular venous montilla A waves [Heart Rate And Rhythm] : heart rate and rhythm were normal [Heart Sounds] : normal S1 and S2 [Murmurs] : no murmurs present [Respiration, Rhythm And Depth] : normal respiratory rhythm and effort [Exaggerated Use Of Accessory Muscles For Inspiration] : no accessory muscle use [Auscultation Breath Sounds / Voice Sounds] : lungs were clear to auscultation bilaterally [Abdomen Soft] : soft [Abdomen Tenderness] : non-tender [Abdomen Mass (___ Cm)] : no abdominal mass palpated [Abnormal Walk] : normal gait [Gait - Sufficient For Exercise Testing] : the gait was sufficient for exercise testing [Nail Clubbing] : no clubbing of the fingernails [Cyanosis, Localized] : no localized cyanosis [Petechial Hemorrhages (___cm)] : no petechial hemorrhages [Skin Color & Pigmentation] : normal skin color and pigmentation [] : no rash [No Venous Stasis] : no venous stasis [Skin Lesions] : no skin lesions [No Skin Ulcers] : no skin ulcer [No Xanthoma] : no  xanthoma was observed [Oriented To Time, Place, And Person] : oriented to person, place, and time [Affect] : the affect was normal [Mood] : the mood was normal [No Anxiety] : not feeling anxious

## 2019-10-29 NOTE — DISCUSSION/SUMMARY
[FreeTextEntry1] : Mr. Peacock is a 64 year-old man with known CAD s/p 2v PCI.\par \par Plan:\par 1. Given the multivessel PCI, c/w DAPT.\par 2. c/w all secondary prev meds.\par 3. Old records requested and reviewed with performing physician.\par 4. Primary and secondary prevention of cardiovascular and related conditions discussed at length, including but not limited to diet and lifestyle modification.\par 5. Patient to return to the office in 3 months.\par \par Thank you for allowing me to participate in the care of your patient. If you have any questions, please feel free to contact me at [phone] or via email at [email or web address].\par \par Sincerely,\par \par Michael Pisano MD [facility]

## 2019-10-29 NOTE — HISTORY OF PRESENT ILLNESS
[FreeTextEntry1] : Mr. Peacock is a 64 year-old man with diabetes, hyperlipidemia and hypertension who presented with unstable angina in August 2019 and received 2 ANNIA to the RCA/LAD. He has been doing well post-PCI. No further angina.

## 2019-11-12 ENCOUNTER — APPOINTMENT (OUTPATIENT)
Dept: CARDIOLOGY | Facility: CLINIC | Age: 64
End: 2019-11-12
Payer: MEDICARE

## 2019-11-12 VITALS
DIASTOLIC BLOOD PRESSURE: 69 MMHG | BODY MASS INDEX: 25.71 KG/M2 | OXYGEN SATURATION: 98 % | HEIGHT: 66 IN | SYSTOLIC BLOOD PRESSURE: 121 MMHG | WEIGHT: 160 LBS | HEART RATE: 76 BPM

## 2019-11-12 PROCEDURE — 93000 ELECTROCARDIOGRAM COMPLETE: CPT

## 2019-11-12 PROCEDURE — 99214 OFFICE O/P EST MOD 30 MIN: CPT

## 2019-11-15 ENCOUNTER — APPOINTMENT (OUTPATIENT)
Dept: CARDIOLOGY | Facility: CLINIC | Age: 64
End: 2019-11-15
Payer: MEDICARE

## 2019-11-15 PROCEDURE — 93306 TTE W/DOPPLER COMPLETE: CPT

## 2019-11-16 NOTE — PHYSICAL EXAM
[General Appearance - Well Developed] : well developed [Normal Appearance] : normal appearance [Well Groomed] : well groomed [General Appearance - Well Nourished] : well nourished [No Deformities] : no deformities [General Appearance - In No Acute Distress] : no acute distress [Normal Conjunctiva] : the conjunctiva exhibited no abnormalities [Eyelids - No Xanthelasma] : the eyelids demonstrated no xanthelasmas [Normal Oral Mucosa] : normal oral mucosa [No Oral Pallor] : no oral pallor [No Oral Cyanosis] : no oral cyanosis [Normal Jugular Venous A Waves Present] : normal jugular venous A waves present [Normal Jugular Venous V Waves Present] : normal jugular venous V waves present [No Jugular Venous Montilla A Waves] : no jugular venous montilla A waves [Respiration, Rhythm And Depth] : normal respiratory rhythm and effort [Exaggerated Use Of Accessory Muscles For Inspiration] : no accessory muscle use [Auscultation Breath Sounds / Voice Sounds] : lungs were clear to auscultation bilaterally [Heart Rate And Rhythm] : heart rate and rhythm were normal [Heart Sounds] : normal S1 and S2 [Murmurs] : no murmurs present [Abdomen Soft] : soft [Abdomen Tenderness] : non-tender [Abdomen Mass (___ Cm)] : no abdominal mass palpated [Abnormal Walk] : normal gait [Gait - Sufficient For Exercise Testing] : the gait was sufficient for exercise testing [Nail Clubbing] : no clubbing of the fingernails [Cyanosis, Localized] : no localized cyanosis [Petechial Hemorrhages (___cm)] : no petechial hemorrhages [Skin Color & Pigmentation] : normal skin color and pigmentation [] : no rash [No Venous Stasis] : no venous stasis [Skin Lesions] : no skin lesions [No Skin Ulcers] : no skin ulcer [No Xanthoma] : no  xanthoma was observed [Oriented To Time, Place, And Person] : oriented to person, place, and time [Affect] : the affect was normal [Mood] : the mood was normal [No Anxiety] : not feeling anxious

## 2019-11-16 NOTE — DISCUSSION/SUMMARY
[FreeTextEntry1] : Mr. Peacock is a 64 year-old man with known CAD s/p 2v PCI.\par \par Plan:\par 1. Given the multivessel PCI, c/w DAPT.\par 2. c/w all secondary prev meds.\par 3. Old records requested and reviewed with performing physician.\par 4. Primary and secondary prevention of cardiovascular and related conditions discussed at length, including but not limited to diet and lifestyle modification.\par 5. Patient to return to the office in 3 months.\par \par Thank you for allowing me to participate in the care of your patient. If you have any questions, please feel free to contact me at [phone] or via email at [email or web address].\par \par Sincerely,\par \par Michael Pisano MD [facility]

## 2019-12-18 ENCOUNTER — APPOINTMENT (OUTPATIENT)
Dept: CV DIAGNOSITCS | Facility: HOSPITAL | Age: 64
End: 2019-12-18

## 2020-03-17 ENCOUNTER — APPOINTMENT (OUTPATIENT)
Dept: CARDIOLOGY | Facility: CLINIC | Age: 65
End: 2020-03-17

## 2020-07-20 ENCOUNTER — EMERGENCY (EMERGENCY)
Facility: HOSPITAL | Age: 65
LOS: 1 days | Discharge: HOME | End: 2020-07-20
Attending: STUDENT IN AN ORGANIZED HEALTH CARE EDUCATION/TRAINING PROGRAM | Admitting: STUDENT IN AN ORGANIZED HEALTH CARE EDUCATION/TRAINING PROGRAM
Payer: MEDICARE

## 2020-07-20 VITALS
OXYGEN SATURATION: 98 % | WEIGHT: 167.99 LBS | RESPIRATION RATE: 16 BRPM | HEIGHT: 66 IN | TEMPERATURE: 98 F | SYSTOLIC BLOOD PRESSURE: 136 MMHG | DIASTOLIC BLOOD PRESSURE: 87 MMHG | HEART RATE: 84 BPM

## 2020-07-20 VITALS
OXYGEN SATURATION: 98 % | RESPIRATION RATE: 16 BRPM | DIASTOLIC BLOOD PRESSURE: 80 MMHG | HEART RATE: 88 BPM | SYSTOLIC BLOOD PRESSURE: 130 MMHG

## 2020-07-20 DIAGNOSIS — Z98.890 OTHER SPECIFIED POSTPROCEDURAL STATES: Chronic | ICD-10-CM

## 2020-07-20 PROCEDURE — 99284 EMERGENCY DEPT VISIT MOD MDM: CPT | Mod: 25

## 2020-07-20 PROCEDURE — 73080 X-RAY EXAM OF ELBOW: CPT | Mod: 26,RT

## 2020-07-20 PROCEDURE — 20605 DRAIN/INJ JOINT/BURSA W/O US: CPT | Mod: RT

## 2020-07-20 PROCEDURE — 73080 X-RAY EXAM OF ELBOW: CPT

## 2020-07-20 NOTE — ED PROVIDER NOTE - CARE PROVIDER_API CALL
Mikal Panchal  ORTHOPAEDIC SURGERY  [address] [place] [address]  Phone: [phone]  Fax: [phone]  Follow Up Time:

## 2020-07-20 NOTE — ED PROVIDER NOTE - NS ED ROS FT
Review of Systems    Constitutional: (-) fever  Eyes/ENT: (-) change in vision, (-) sore throat, (-) ear pain  Cardiovascular: (-) chest pain, (-) palpitation   Respiratory: (-) cough, (-) shortness of breath  Gastrointestinal: (-) abdominal pain (-) vomiting, (-) diarrhea  Musculoskeletal: (-) neck pain, (-) back pain, (-) joint pain  Integumentary: (-) rash, (-) edema  Neurological: (-) headache, (-) altered mental status  Heme/Lymph: (-) easy bruising (-) easy bleeding   Allergic/Immunologic: (-) pruritus

## 2020-07-20 NOTE — ED PROVIDER NOTE - PATIENT PORTAL LINK FT
You can access the FollowMyHealth Patient Portal offered by [facility] by registering at the following website: [email or web address]. By joining [employer]’s FollowMyHealth portal, you will also be able to view your health information using other applications (apps) compatible with our system.

## 2020-07-20 NOTE — ED PROVIDER NOTE - CLINICAL SUMMARY MEDICAL DECISION MAKING FREE TEXT BOX
66 yo m pw r elbow bursitis with ttp and swelling over the R elbow. I&D shows bloody fluid. Bursitis of R elbow non infectious.

## 2020-07-20 NOTE — ED PROVIDER NOTE - OBJECTIVE STATEMENT
66 yo m pw swelling over the olecranon of the R elbow with no erythema, no ttp. Reports the swelling as fluctuant noted the swelling yesterday after waking up, unknown precipitating factor. Reports the R elbow ROM is maintained with no pain with flexion and rotation.    I have reviewed available current nursing and previous documentation of past medical, surgical, family, and/or social history.

## 2020-07-20 NOTE — ED PROVIDER NOTE - ATTENDING CONTRIBUTION TO CARE
66yo M with DM, HTN, CAD on asa and plavix pw right elbow swelling for several days. no pain, has not taken anything for pain. no fevers, no chills, no decreased ROM, no trauma or injury to area  on exam + bursitis of elbow, mild warmth, no tenderness, arm with FROM  will get xray, consider needle aspiration, ace wrap, ortho follo wup 66yo M with DM, HTN, CAD on asa and plavix pw right elbow swelling for several days. no pain, has not taken anything for pain. no fevers, no chills, no decreased ROM, no trauma or injury to area  on exam + olecranon bursitis mild warmth, no tenderness, arm with FROM  will get xray, consider needle aspiration, ace wrap, ortho follo wup

## 2020-07-20 NOTE — ED PROVIDER NOTE - PHYSICAL EXAMINATION
Physical Exam    Vital Signs: I have reviewed the initial vital signs.  Constitutional: well-nourished, appears stated age, no acute distress  Eyes: PERRLA, EOM intact, RAPD absent, no conjuctival injection, and symmetrical lids.  ENT: Neck supple with no adenopathy, moist MM.  Cardiovascular: regular rate, regular rhythm, well-perfused extremities  Respiratory: unlabored respiratory effort, clear to auscultation bilaterally  Gastrointestinal: soft, non-tender abdomen, no pulsatile mass  Musculoskeletal: +bursitis over the R elbow with no erythema, no induration, +fluctuance and swelling over the R elbow. Full ROM in R elbow. Needle I&D shows bloody non purulent aspirate.  Integumentary: warm, dry, no rash  Neurologic: awake, alert, cranial nerves II-XII grossly intact, extremities’ motor and sensory functions grossly intact  Psychiatric: A&Ox3

## 2020-10-14 RX ORDER — ASPIRIN ENTERIC COATED TABLETS 81 MG 81 MG/1
81 TABLET, DELAYED RELEASE ORAL
Qty: 90 | Refills: 0 | Status: ACTIVE | COMMUNITY
Start: 1900-01-01 | End: 1900-01-01

## 2020-10-30 ENCOUNTER — RX RENEWAL (OUTPATIENT)
Age: 65
End: 2020-10-30

## 2020-11-23 ENCOUNTER — RX RENEWAL (OUTPATIENT)
Age: 65
End: 2020-11-23

## 2020-11-30 ENCOUNTER — RX RENEWAL (OUTPATIENT)
Age: 65
End: 2020-11-30

## 2020-12-07 ENCOUNTER — RX RENEWAL (OUTPATIENT)
Age: 65
End: 2020-12-07

## 2020-12-23 ENCOUNTER — RX RENEWAL (OUTPATIENT)
Age: 65
End: 2020-12-23

## 2021-01-05 ENCOUNTER — APPOINTMENT (OUTPATIENT)
Dept: CARDIOLOGY | Facility: CLINIC | Age: 66
End: 2021-01-05
Payer: MEDICARE

## 2021-01-05 VITALS — DIASTOLIC BLOOD PRESSURE: 63 MMHG | OXYGEN SATURATION: 98 % | SYSTOLIC BLOOD PRESSURE: 111 MMHG | HEART RATE: 75 BPM

## 2021-01-05 DIAGNOSIS — I25.10 ATHEROSCLEROTIC HEART DISEASE OF NATIVE CORONARY ARTERY W/OUT ANGINA PECTORIS: ICD-10-CM

## 2021-01-05 DIAGNOSIS — E78.5 HYPERLIPIDEMIA, UNSPECIFIED: ICD-10-CM

## 2021-01-05 DIAGNOSIS — I10 ESSENTIAL (PRIMARY) HYPERTENSION: ICD-10-CM

## 2021-01-05 PROCEDURE — 99215 OFFICE O/P EST HI 40 MIN: CPT

## 2021-01-05 NOTE — DISCUSSION/SUMMARY
[FreeTextEntry1] : Mr. Peacock is a 65 year-old man with known CAD s/p 2v PCI.\par \par Plan:\par 1. Given the multivessel PCI, c/w DAPT. Given his new exertional angina - will send for urgent cardiac catheterization ASAP for the unstable symptoms.\par 2. c/w all secondary prev meds.\par 3. Old records requested and reviewed with performing physician.\par 4. Primary and secondary prevention of cardiovascular and related conditions discussed at length, including but not limited to diet and lifestyle modification.\par 5. Patient to return to the office in 3 months.\par \par Thank you for allowing me to participate in the care of your patient. If you have any questions, please feel free to contact me at [phone] or via email at [email or web address].\par \par Sincerely,\par \par Michael Pisano MD [facility]

## 2021-01-05 NOTE — HISTORY OF PRESENT ILLNESS
[FreeTextEntry1] : Mr. Peacock is a 65 year-old man with diabetes, hyperlipidemia and hypertension who presented with unstable angina in August 2019 and received 2 ANNIA to the RCA/LAD. He has been doing well post-PCI. More recently he has been feeling more chest discomfort with exertion. Feels similar to his last episode of angina when tiana needed stents placed.

## 2021-01-05 NOTE — REASON FOR VISIT
Kenna David M.D.  [phone]    Instructions following colonoscopy:    ACTIVITY:   Resume usual, basic activities around the house today.  You may be light-headed or sleepy from anesthesia, so be careful going up and down stairs.  Avoid driving, operating machinery, or signing documents for 24 hours. DIET:   No restriction. Please note, some people may have nausea or cramps after this procedure which can result in an upset stomach after eating.  Many people have loose stools or diarrhea immediately after colonoscopy. It is also not uncommon to not have a bowel movement for 2-3 days. PAIN:   Some cramping or gas pain is normal after colonoscopy. However, if you experience worsening pain over the course of the day, or pain with associated fever please call the office immediately      [id number] IF:   You have a temperature higher than 101.5° Fahrenheit for more than 6 hours.  You have severe nausea or vomiting not relieved by medication; or diarrhea. If you take a blood thinning medicine resume it: Follow up on pathology results. Otherwise, continue home medications as previously prescribed. [Follow-Up - From Hospitalization] : follow-up of a recent hospitalization for [Coronary Artery Disease] : coronary artery disease

## 2021-01-06 ENCOUNTER — FORM ENCOUNTER (OUTPATIENT)
Age: 66
End: 2021-01-06

## 2021-01-06 ENCOUNTER — OUTPATIENT (OUTPATIENT)
Dept: OUTPATIENT SERVICES | Facility: HOSPITAL | Age: 66
LOS: 1 days | End: 2021-01-06
Payer: MEDICARE

## 2021-01-06 DIAGNOSIS — Z98.890 OTHER SPECIFIED POSTPROCEDURAL STATES: Chronic | ICD-10-CM

## 2021-01-06 DIAGNOSIS — Z20.828 CONTACT WITH AND (SUSPECTED) EXPOSURE TO OTHER VIRAL COMMUNICABLE DISEASES: ICD-10-CM

## 2021-01-06 LAB — SARS-COV-2 RNA SPEC QL NAA+PROBE: SIGNIFICANT CHANGE UP

## 2021-01-06 PROCEDURE — C9803: CPT

## 2021-01-06 PROCEDURE — U0005: CPT

## 2021-01-06 PROCEDURE — U0003: CPT

## 2021-01-07 ENCOUNTER — OUTPATIENT (OUTPATIENT)
Dept: OUTPATIENT SERVICES | Facility: HOSPITAL | Age: 66
LOS: 1 days | End: 2021-01-07
Payer: MEDICARE

## 2021-01-07 VITALS
SYSTOLIC BLOOD PRESSURE: 158 MMHG | WEIGHT: 164.91 LBS | DIASTOLIC BLOOD PRESSURE: 80 MMHG | OXYGEN SATURATION: 100 % | RESPIRATION RATE: 20 BRPM | TEMPERATURE: 98 F | HEIGHT: 66 IN | HEART RATE: 83 BPM

## 2021-01-07 VITALS
SYSTOLIC BLOOD PRESSURE: 158 MMHG | HEART RATE: 78 BPM | OXYGEN SATURATION: 98 % | DIASTOLIC BLOOD PRESSURE: 80 MMHG | RESPIRATION RATE: 18 BRPM

## 2021-01-07 DIAGNOSIS — Z95.5 PRESENCE OF CORONARY ANGIOPLASTY IMPLANT AND GRAFT: Chronic | ICD-10-CM

## 2021-01-07 DIAGNOSIS — I25.10 ATHEROSCLEROTIC HEART DISEASE OF NATIVE CORONARY ARTERY WITHOUT ANGINA PECTORIS: ICD-10-CM

## 2021-01-07 DIAGNOSIS — Z98.890 OTHER SPECIFIED POSTPROCEDURAL STATES: Chronic | ICD-10-CM

## 2021-01-07 LAB
ANION GAP SERPL CALC-SCNC: 12 MMOL/L — SIGNIFICANT CHANGE UP (ref 5–17)
BUN SERPL-MCNC: 38 MG/DL — HIGH (ref 7–23)
CALCIUM SERPL-MCNC: 9.1 MG/DL — SIGNIFICANT CHANGE UP (ref 8.4–10.5)
CHLORIDE SERPL-SCNC: 104 MMOL/L — SIGNIFICANT CHANGE UP (ref 96–108)
CO2 SERPL-SCNC: 21 MMOL/L — LOW (ref 22–31)
CREAT SERPL-MCNC: 1.76 MG/DL — HIGH (ref 0.5–1.3)
GLUCOSE BLDC GLUCOMTR-MCNC: 133 MG/DL — HIGH (ref 70–99)
GLUCOSE SERPL-MCNC: 133 MG/DL — HIGH (ref 70–99)
HCT VFR BLD CALC: 30.7 % — LOW (ref 39–50)
HGB BLD-MCNC: 10.3 G/DL — LOW (ref 13–17)
MCHC RBC-ENTMCNC: 27.7 PG — SIGNIFICANT CHANGE UP (ref 27–34)
MCHC RBC-ENTMCNC: 33.6 GM/DL — SIGNIFICANT CHANGE UP (ref 32–36)
MCV RBC AUTO: 82.5 FL — SIGNIFICANT CHANGE UP (ref 80–100)
NRBC # BLD: 0 /100 WBCS — SIGNIFICANT CHANGE UP (ref 0–0)
PLATELET # BLD AUTO: 212 K/UL — SIGNIFICANT CHANGE UP (ref 150–400)
POTASSIUM SERPL-MCNC: 4.7 MMOL/L — SIGNIFICANT CHANGE UP (ref 3.5–5.3)
POTASSIUM SERPL-SCNC: 4.7 MMOL/L — SIGNIFICANT CHANGE UP (ref 3.5–5.3)
RBC # BLD: 3.72 M/UL — LOW (ref 4.2–5.8)
RBC # FLD: 13.3 % — SIGNIFICANT CHANGE UP (ref 10.3–14.5)
SODIUM SERPL-SCNC: 137 MMOL/L — SIGNIFICANT CHANGE UP (ref 135–145)
WBC # BLD: 3.68 K/UL — LOW (ref 3.8–10.5)
WBC # FLD AUTO: 3.68 K/UL — LOW (ref 3.8–10.5)

## 2021-01-07 PROCEDURE — 80048 BASIC METABOLIC PNL TOTAL CA: CPT

## 2021-01-07 PROCEDURE — C1769: CPT

## 2021-01-07 PROCEDURE — 85027 COMPLETE CBC AUTOMATED: CPT

## 2021-01-07 PROCEDURE — 93010 ELECTROCARDIOGRAM REPORT: CPT

## 2021-01-07 PROCEDURE — 93005 ELECTROCARDIOGRAM TRACING: CPT

## 2021-01-07 PROCEDURE — 93454 CORONARY ARTERY ANGIO S&I: CPT

## 2021-01-07 PROCEDURE — 93454 CORONARY ARTERY ANGIO S&I: CPT | Mod: 26

## 2021-01-07 PROCEDURE — 82962 GLUCOSE BLOOD TEST: CPT

## 2021-01-07 PROCEDURE — 99152 MOD SED SAME PHYS/QHP 5/>YRS: CPT

## 2021-01-07 PROCEDURE — C1887: CPT

## 2021-01-07 PROCEDURE — C1894: CPT

## 2021-01-07 RX ORDER — SODIUM CHLORIDE 9 MG/ML
3 INJECTION INTRAMUSCULAR; INTRAVENOUS; SUBCUTANEOUS EVERY 8 HOURS
Refills: 0 | Status: DISCONTINUED | OUTPATIENT
Start: 2021-01-07 | End: 2021-01-21

## 2021-01-07 RX ORDER — INSULIN GLARGINE 100 [IU]/ML
12 INJECTION, SOLUTION SUBCUTANEOUS
Qty: 0 | Refills: 0 | DISCHARGE

## 2021-01-07 RX ORDER — GABAPENTIN 400 MG/1
100 CAPSULE ORAL
Qty: 0 | Refills: 0 | DISCHARGE

## 2021-01-07 RX ORDER — DAPAGLIFLOZIN 10 MG/1
1 TABLET, FILM COATED ORAL
Qty: 0 | Refills: 0 | DISCHARGE

## 2021-01-07 RX ORDER — SODIUM CHLORIDE 9 MG/ML
1000 INJECTION INTRAMUSCULAR; INTRAVENOUS; SUBCUTANEOUS
Refills: 0 | Status: DISCONTINUED | OUTPATIENT
Start: 2021-01-07 | End: 2021-01-21

## 2021-01-07 RX ORDER — INSULIN GLARGINE 100 [IU]/ML
14 INJECTION, SOLUTION SUBCUTANEOUS
Qty: 0 | Refills: 0 | DISCHARGE

## 2021-01-07 RX ORDER — FENOFIBRATE,MICRONIZED 130 MG
1 CAPSULE ORAL
Qty: 0 | Refills: 0 | DISCHARGE

## 2021-01-07 RX ORDER — GEMFIBROZIL 600 MG
1 TABLET ORAL
Qty: 0 | Refills: 0 | DISCHARGE

## 2021-01-07 RX ORDER — METFORMIN HYDROCHLORIDE 850 MG/1
500 TABLET ORAL
Qty: 0 | Refills: 0 | DISCHARGE

## 2021-01-07 RX ORDER — CARVEDILOL PHOSPHATE 80 MG/1
1 CAPSULE, EXTENDED RELEASE ORAL
Qty: 0 | Refills: 0 | DISCHARGE

## 2021-01-07 NOTE — ASU DISCHARGE PLAN (ADULT/PEDIATRIC) - ASU DC SPECIAL INSTRUCTIONSFT
No heavy lifting or pushing/pulling with procedure arm for 2 weeks. No driving for 2 days. You may shower 24 hours following the procedure but avoid baths/swimming for 1 week. Check your wrist site for bleeding and/or swelling daily following procedure and call your doctor immediately if it occurs or if you experience increased pain at the site. Follow up with your cardiologist in 1-2 weeks. You may call [facility] if you have any questions/concerns regarding your procedure [phone].

## 2021-01-07 NOTE — ASU DISCHARGE PLAN (ADULT/PEDIATRIC) - CARE PROVIDER_API CALL
Michael Pisano  CARDIOLOGY  [facility] Dept of Cardiology, [address] [place] [address]  Phone: [phone]  Fax: [phone]  Follow Up Time: 2 weeks

## 2021-01-07 NOTE — H&P CARDIOLOGY - HISTORY OF PRESENT ILLNESS
66 y/o male w/ PMH hld, HTN, DM who presented with unstable angina in August 2019 and received 2 ANNIA to the RCA/LAD. He has been doing well post-PCI. More recently he has been feeling more chest discomfort with exertion. Feels similar to his last episode of angina when he needed stents placed. Seen & evaluated by DR Pisano Now recommends for cardiac cath.

## 2021-01-11 ENCOUNTER — RX RENEWAL (OUTPATIENT)
Age: 66
End: 2021-01-11

## 2021-01-21 ENCOUNTER — RX RENEWAL (OUTPATIENT)
Age: 66
End: 2021-01-21

## 2021-01-29 RX ORDER — CARVEDILOL 6.25 MG/1
6.25 TABLET, FILM COATED ORAL
Qty: 180 | Refills: 3 | Status: ACTIVE | COMMUNITY
Start: 2019-08-27 | End: 1900-01-01

## 2021-01-29 RX ORDER — ISOSORBIDE MONONITRATE 30 MG/1
30 TABLET, EXTENDED RELEASE ORAL
Qty: 90 | Refills: 3 | Status: ACTIVE | COMMUNITY
Start: 2021-01-11 | End: 1900-01-01

## 2021-01-29 RX ORDER — CLOPIDOGREL BISULFATE 75 MG/1
75 TABLET, FILM COATED ORAL
Qty: 90 | Refills: 3 | Status: ACTIVE | COMMUNITY
Start: 2021-01-11 | End: 1900-01-01

## 2021-01-29 RX ORDER — ACETAMINOPHEN/DIPHENHYDRAMINE 500MG-25MG
81 TABLET ORAL
Qty: 90 | Refills: 3 | Status: ACTIVE | COMMUNITY
Start: 2021-01-11 | End: 1900-01-01

## 2022-07-19 NOTE — ED ADULT TRIAGE NOTE - SOURCE OF INFORMATION
Patient Terbinafine Counseling: Patient counseling regarding adverse effects of terbinafine including but not limited to headache, diarrhea, rash, upset stomach, liver function test abnormalities, itching, taste/smell disturbance, nausea, abdominal pain, and flatulence.  There is a rare possibility of liver failure that can occur when taking terbinafine.  The patient understands that a baseline LFT and kidney function test may be required. The patient verbalized understanding of the proper use and possible adverse effects of terbinafine.  All of the patient's questions and concerns were addressed.

## 2023-08-28 NOTE — DISCHARGE NOTE PROVIDER - NSDCHC_MEDRECSTATUS_GEN_ALL_CORE
Skin Reaction?: expected skin reaction Send Cpt Codes To Pm?: Yes Date Of Fraction 1: 07/31/2023 Admission Reconciliation is Completed  Discharge Reconciliation is Completed Date Of Fraction 18: 08/23/2023 Dimensions-Y Axis In Cm: 0 Date Of Fraction 9: 08/10/2023 Radiation Units: cGy Date Of Fraction 17: 08/22/2023 Date Of Fraction 8: 08/09/2023 Total Rx Dosage: [address] Date Of Fraction 16: 08/21/2023 Assessment: Appropriate reaction Date Of Fraction 7: 08/08/2023 Ebt Cpt Code (Please Add Code Details Below):  Clinical Recommendations: Skin recommendations for mild, moderate, brisk erythema or dry desquamation How Was The Treatment Tolerated?: very well Date Of Fraction 15: 08/18/2023 Additional Change To Daily Dosage Administered Mid Treatment?: No Date Of Fraction 6: 08/07/2023 Date Of Fraction 23: 08/30/2023 Date Of Fraction 14: 08/17/2023 Date Of Fraction 5: 08/04/2023 Total Planned Fractions: [facility] Date Of Fraction 22: 08/29/2023 Date Of Fraction 13: 08/16/2023 Lesion Regression?: 100 Date Of Fraction 4: 08/03/2023 Daily Dosage Administered: [id number] [facility] Date Of Fraction 21: 08/28/2023 Location Override (Location Will Render As Ema Body Location If Left Blank): Right Lateral Malar Cheek Date Of Fraction 12: 08/15/2023 Early, Moist Desquamation Counseling: The patient was instructed in meticulous wound care and counseled on potential and expected side effects of treatment and reasonable expectations for the time to heal. They appeared to have all of their questions answered to their satisfaction. They were recommended to cleanse the treatment site with dilute hydrogen peroxide and water (using 50:50 ratio). They were told how to apply the solution gently with a cotton ball twice daily. After cleaning, they were instructed to pat the area dry with a soft cloth and then apply a small amount of Silvadene 1% cream twice daily. They were told to return to the clinic in 7 days for re-evaluation. The patient understands to call with any questions, concerns or difficulties. They were informed of the potentital for infection and counseled on common signs and symptoms of infection including skin redness extending outside of the treatment area, enlargement or skin breakdown, significant warmth, pain, fever or chills or sweats. They voiced an understanding to contact us immediately if any of these symptoms develop. Their follow up appointment was scheduled. They were also instructed to follow-up with dermatology for skin cancer surveillance. Date Of Fraction 3: 08/02/2023 Date Of Fraction 20: 08/25/2023 Detail Level: Simple Date Of Fraction 11: 08/14/2023 Date Of Fraction 2: 08/01/2023 Date Of Fraction 19: 08/24/2023 Mild, Moderate, Brisk Erythema Or Dry Desquamation Counseling: The patient was instructed in meticulous wound care and counseled on potential and expected side effects of treatment and reasonable expectations for the time to heal. He appeared to have all of his questions answered to his satisfaction. He was recommended to rinse the treatment site with mild soap and water (recommended Dove, Neutrogena or Cetaphil). After rinsing the treatment site twice a day they are to apply a pea-sized amount of Aquaphor healing ointment twice daily and Mupirocin BID. Aquaphor samples were provided. He understands to call with any questions, concerns or difficulties. He was informed of the potential for infection and counseled on common signs and symptoms of infection including skin redness extending outside of the treatment area, enlargement or skin breakdown, significant warmth, pain, fever or chills or sweats. He voiced an understanding to contact us immediately if any of these symptoms develop. His follow up appointment was scheduled for 9/18/23. He was also instructed to follow-up with dermatology for skin cancer surveillance. Date Of Fraction 10: 08/11/2023

## 2023-09-14 NOTE — ED ADULT NURSE NOTE - MUSCULOSKELETAL WDL
Full range of motion of upper and lower extremities, no joint tenderness/swelling.
Chronic hypertension

## 2024-06-26 NOTE — ED ADULT NURSE NOTE - NS ED NURSE RECORD ANOTHER HT AND WT
Routine fasting labs ordered  Added in immunoglobulins per pt request due to being frequently ill   Yes

## 2024-08-27 NOTE — ED ADULT TRIAGE NOTE - IDEAL BODY WEIGHT(KG)
63 [TextEntry] : Em Park MD Pediatric Endocrinologist Division of Pediatric Endocrinology  [facility], [facility]

## 2024-10-08 NOTE — ED ADULT NURSE NOTE - CHIEF COMPLAINT
2nd attempt:    Live well sent, lab order placed- due now.   Follow up due now- # provided to patient.    The patient is a 63y Male complaining of upper leg pain/injury.
